# Patient Record
Sex: MALE | Race: WHITE | NOT HISPANIC OR LATINO | Employment: OTHER | URBAN - METROPOLITAN AREA
[De-identification: names, ages, dates, MRNs, and addresses within clinical notes are randomized per-mention and may not be internally consistent; named-entity substitution may affect disease eponyms.]

---

## 2022-10-26 ENCOUNTER — MEDICAL CORRESPONDENCE (OUTPATIENT)
Dept: HEALTH INFORMATION MANAGEMENT | Facility: CLINIC | Age: 72
End: 2022-10-26

## 2022-10-26 ENCOUNTER — TRANSFERRED RECORDS (OUTPATIENT)
Dept: HEALTH INFORMATION MANAGEMENT | Facility: CLINIC | Age: 72
End: 2022-10-26

## 2022-11-28 ENCOUNTER — TRANSCRIBE ORDERS (OUTPATIENT)
Dept: OTHER | Age: 72
End: 2022-11-28

## 2022-11-28 DIAGNOSIS — D35.2 BENIGN NEOPLASM OF PITUITARY GLAND (H): Primary | ICD-10-CM

## 2022-11-29 DIAGNOSIS — D35.2 PITUITARY ADENOMA (H): Primary | ICD-10-CM

## 2022-11-29 ASSESSMENT — ENCOUNTER SYMPTOMS: SINUS CONGESTION: 1

## 2022-11-29 NOTE — TELEPHONE ENCOUNTER
RECORDS RECEIVED FROM: external   REASON FOR VISIT: Benign neoplasm of pituitary gland   Date of Appt: 11/30/22   NOTES (FOR ALL VISITS) STATUS DETAILS   OFFICE NOTE from referring provider Received Dr Antonio Resendez @ [facility] Neurosurgery:  11/17/22   MEDICATION LIST Received    IMAGING  (FOR ALL VISITS)     MRI (HEAD, NECK, SPINE) Received [facility]:  MRI Brain 11/8/22   CT (HEAD, NECK, SPINE) Received [facility]:  CT Maxillofacial 11/22/22 *report with images in PACS*

## 2022-11-30 ENCOUNTER — LAB (OUTPATIENT)
Dept: LAB | Facility: CLINIC | Age: 72
End: 2022-11-30
Payer: COMMERCIAL

## 2022-11-30 ENCOUNTER — PRE VISIT (OUTPATIENT)
Dept: NEUROSURGERY | Facility: CLINIC | Age: 72
End: 2022-11-30

## 2022-11-30 ENCOUNTER — PREP FOR PROCEDURE (OUTPATIENT)
Dept: NEUROLOGY | Facility: CLINIC | Age: 72
End: 2022-11-30

## 2022-11-30 ENCOUNTER — OFFICE VISIT (OUTPATIENT)
Dept: NEUROSURGERY | Facility: CLINIC | Age: 72
End: 2022-11-30
Payer: COMMERCIAL

## 2022-11-30 VITALS
RESPIRATION RATE: 16 BRPM | DIASTOLIC BLOOD PRESSURE: 83 MMHG | OXYGEN SATURATION: 97 % | SYSTOLIC BLOOD PRESSURE: 142 MMHG | HEART RATE: 52 BPM

## 2022-11-30 DIAGNOSIS — D35.2 PITUITARY ADENOMA (H): ICD-10-CM

## 2022-11-30 DIAGNOSIS — D35.2 PITUITARY ADENOMA WITH EXTRASELLAR EXTENSION (H): Primary | ICD-10-CM

## 2022-11-30 LAB
ANION GAP SERPL CALCULATED.3IONS-SCNC: 9 MMOL/L (ref 7–15)
BUN SERPL-MCNC: 20.7 MG/DL (ref 8–23)
CALCIUM SERPL-MCNC: 10.1 MG/DL (ref 8.8–10.2)
CHLORIDE SERPL-SCNC: 102 MMOL/L (ref 98–107)
CORTIS SERPL-MCNC: 11.8 UG/DL
CREAT SERPL-MCNC: 1.17 MG/DL (ref 0.67–1.17)
DEPRECATED HCO3 PLAS-SCNC: 30 MMOL/L (ref 22–29)
FSH SERPL IRP2-ACNC: 4.6 MIU/ML (ref 1.5–12.4)
GFR SERPL CREATININE-BSD FRML MDRD: 67 ML/MIN/1.73M2
GLUCOSE SERPL-MCNC: 119 MG/DL (ref 70–99)
LH SERPL-ACNC: 4.6 MIU/ML (ref 1.7–8.6)
POTASSIUM SERPL-SCNC: 4.2 MMOL/L (ref 3.4–5.3)
PROLACTIN SERPL 3RD IS-MCNC: 13 NG/ML (ref 4–15)
SODIUM SERPL-SCNC: 141 MMOL/L (ref 136–145)
T4 FREE SERPL-MCNC: 0.93 NG/DL (ref 0.9–1.7)
TSH SERPL DL<=0.005 MIU/L-ACNC: 1.76 UIU/ML (ref 0.3–4.2)

## 2022-11-30 PROCEDURE — 84443 ASSAY THYROID STIM HORMONE: CPT | Performed by: PATHOLOGY

## 2022-11-30 PROCEDURE — 83001 ASSAY OF GONADOTROPIN (FSH): CPT | Mod: 90 | Performed by: PATHOLOGY

## 2022-11-30 PROCEDURE — 99000 SPECIMEN HANDLING OFFICE-LAB: CPT | Performed by: PATHOLOGY

## 2022-11-30 PROCEDURE — 84305 ASSAY OF SOMATOMEDIN: CPT | Mod: 90 | Performed by: PATHOLOGY

## 2022-11-30 PROCEDURE — 83003 ASSAY GROWTH HORMONE (HGH): CPT | Mod: 90 | Performed by: PATHOLOGY

## 2022-11-30 PROCEDURE — 80048 BASIC METABOLIC PNL TOTAL CA: CPT | Performed by: PATHOLOGY

## 2022-11-30 PROCEDURE — 82024 ASSAY OF ACTH: CPT | Mod: 90 | Performed by: PATHOLOGY

## 2022-11-30 PROCEDURE — 83002 ASSAY OF GONADOTROPIN (LH): CPT | Mod: 90 | Performed by: PATHOLOGY

## 2022-11-30 PROCEDURE — 84439 ASSAY OF FREE THYROXINE: CPT | Performed by: PATHOLOGY

## 2022-11-30 PROCEDURE — 82533 TOTAL CORTISOL: CPT | Mod: 90 | Performed by: PATHOLOGY

## 2022-11-30 PROCEDURE — 84146 ASSAY OF PROLACTIN: CPT | Mod: 90 | Performed by: PATHOLOGY

## 2022-11-30 PROCEDURE — 99205 OFFICE O/P NEW HI 60 MIN: CPT | Performed by: NEUROLOGICAL SURGERY

## 2022-11-30 PROCEDURE — 84403 ASSAY OF TOTAL TESTOSTERONE: CPT | Mod: 90 | Performed by: PATHOLOGY

## 2022-11-30 PROCEDURE — 36415 COLL VENOUS BLD VENIPUNCTURE: CPT | Performed by: PATHOLOGY

## 2022-11-30 RX ORDER — CEFTRIAXONE 2 G/1
2 INJECTION, POWDER, FOR SOLUTION INTRAMUSCULAR; INTRAVENOUS EVERY 12 HOURS
Status: CANCELLED | OUTPATIENT
Start: 2022-11-30

## 2022-11-30 ASSESSMENT — PAIN SCALES - GENERAL: PAINLEVEL: MILD PAIN (3)

## 2022-11-30 NOTE — PATIENT INSTRUCTIONS
"You were seen today with Dr. Richard Lim. Your primary contact after today's visit is: Debra RN    Next steps:  We will begin planning for surgery in approximately May of 2023.   Labs today - 1st floor of this building, take a left out of the elevator area.      How to Contact Us  Send a Mailpilehart message to your provider.   Call the clinic - your call will be routed appropriately.   Neurosurgery Clinic: [phone]  ENT Clinic: [phone]   To speak directly to an RN Care Coordinator:  Debra RN: [phone]  Araseli RN: [phone]    Note: We do our best to check voicemail frequently throughout the day and make every effort to return calls within 1-2 business days. For urgent matters, please use the general clinic phone numbers listed above.       Endoscopic Endonasal Surgery Instructions    PREPARING FOR SURGERY  You will need a preoperative assessment within 30 days of your surgery. We will set this up for you once we know your surgery date.  Before surgery, call the clinic:   If there is any change in your health, or you are having more frequent or severe symptoms   If you develop a cold or flu, or if you test positive for COVID-19   If you have any questions about what to expect before, during, or after surgery   If you need assistance filling out paperwork for short-term disability or FMLA, or you need a letter excusing you from work       COVID-19 TESTING BEFORE SURGERY  The COVID-19 pandemic is a very challenging time for everyone. Our goal is to keep you and our team safe and healthy. You will need a COVID-19 test within 4 days of surgery. This MUST be a PCR test (a \"rapid\" test does not count). To schedule a PCR test, call [phone] ([phone]) or visit: [email or web address]. We urge you to get tested at one of our [facility] testing sites. We process tests in our lab and can get the results quickly.    If you live outside of the [facility] and need to complete " this test at another healthcare system:  Let us know the name of the clinic/lab and their fax number. We will need to send them an order before you schedule.  Plan ahead, and schedule your COVID test as soon as you know your surgery date.  Ask your clinic to fax the results to [facility] at fax # [phone].    After your test and before your procedure, please follow these safety guidelines. They help to protect you and lower the risk of your surgery being postponed.  Limit trips outside your home.  Limit the number of people you see.  Always wear a face covering outside your home.  Use social distancing and stay 6 feet away from others whenever you can.  Wash your hands often.    Please let us know if you have tested positive for COVID within 90 days of your surgery - you may not need to have a COVID test. If your test before surgery shows you have COVID-19, we will probably need to postpone your surgery.      MEDICATIONS BEFORE SURGERY  You will receive specific instructions about how to take your medications at your preoperative assessment visit. Talk to your care team about every medication that you take, including over-the-counter medications and supplements. Some medications can make you bleed too much during surgery, and some change how well anesthesia drugs work. Follow these general instructions for common medications, unless otherwise directed.     INSTRUCTIONS MEDICATION   Hold for 7 days before surgery  Supplements   Multivitamins   Fish Oil     Hold for 7 days before surgery Aspirin (note: some medications can contain aspirin, like Daija-Oakley)     Hold for 7 days before surgery Ibuprofen (Advil, Motrin)     Hold for 4 days before surgery  Naproxen (Aleve)      Talk with the Preoperative Assessment Center (PAC) about how to take these medications before surgery    Insulin or oral medications for diabetes     Blood pressure medications     Anticoagulant medications, including:    warfarin  (Coumadin)   enoxaparin (Lovenox)   dabigatran (Pradaxa)   apixaban (Eliquis)   rivaroxaban (Xarelto)     Antiplatelet medications, including:   clopidogrel bisulfate (Plavix)   cilostazol (Pletal)      Okay to keep taking for pain, as needed    Acetaminophen (Tylenol)        EATING AND DRINKING BEFORE SURGERY  Eat and drink as usual until 8 hours before your surgery arrival time. After that, no food or milk.   Drink clear liquids until 1 hour before your surgery arrival time.  Clear liquids are liquids you can see through, like water, Gatorade, and Propel. You may also have black coffee and tea (no cream or milk).   Nothing by mouth within 1 hour of your surgery arrival time. This includes gum, candy, and breath mints.   If your care team tells you take medicine on the morning of surgery, it is okay to take medicine with a sip of water.   Do not drink alcohol for at least 24 hours before surgery. Do not use marijuana for 7 days prior to surgery.      PREVENTING INFECTION  Shower or bathe the night before and morning of your surgery following the instructions on your handout,  Showering Before Surgery.    Note: Only use the special antiseptic soap from your neck to your toes. Your care team will clean the skin at your surgery site.   Don t shave or clip hair near your surgery site. We ll remove the hair if needed.   Having diabetes and/or smoking can cause delayed wound healing and increase your risk of a surgical site infection. Quitting smoking and lowering your blood sugars can make a big difference. If you would like support with this, please let us know or work with your primary care provider.        SMOKING AND NICOTINE  Don t smoke or vape the morning of surgery. You may chew nicotine gum up to 2 hours before surgery. A nicotine patch is okay.   We strongly recommend quitting smoking and other tobacco products. Nicotine can cause delayed healing and wound complications after surgery.       PLANNING AHEAD -  "FINANCES  [email or web address]  [facility] Billing: Please call [phone], if you wish to pay a bill.  [facility] Financial Counselors: Please call [phone], if you have questions about possible costs and coverage or to discuss options if you don't have enough - or no - insurance for your care.  [facility] Cost of Care Estimates: Please visit the website to view our online estimate tool. If you have additional questions, call [phone] for estimates.  Our financial team will contact your insurance company as soon as surgery is scheduled. If your insurance company requires a prior authorization (pre-approval), our financial team will complete these necessary steps. Typically, we don t encounter many issues with insurance denying coverage for skull base surgery.    It is a good idea to call your insurance company and let them know you re having surgery. Ask questions about your deductible, co-insurance, and what of out-of-pocket costs you will be responsible for.   [employer]: You might hear about a company called [employer], with whom we contract to provide monitoring of your nerves during surgery (called \"intraoperative neuromonitoring\"). You may receive a letter from [employer] after your surgery, and this company and letter are legitimate. For questions and more information, please visit: [email or web address]      COVID-19 AND VISITOR RESTRICTIONS  Please visit [email or web address] for the latest information about hospital visitor restrictions.      HEALTHCARE DIRECTIVE  Consider preparing a Health Care Directive and choosing a Health Care Agent. A Health Care Directive is a written plan outlining your values and priorities for your future medical treatment. A Health Care Agent makes health care decisions based on your wishes " "if you are unable to communicate.   Download a document, information materials or register for a free class on advance care planning and creating a health care directive by visiting [email or web address], calling [phone], or emailing [email or web address].   If you have a health care directive or choose to complete a healthcare directive before surgery, please upload the document to [email or web address]. This will be attached to your chart. You may also want to bring a copy with you on the day of surgery.       YOUR SURGERY DAY  Parking:   Both self-park and  parking (24 hours, 7 days a week) are available at the [facility]. Self-park and  rates are the same. If the Patient Visitors Ramp is full or if a patient or visitor has limited mobility, please follow the signs to the  located at the main entrance. For more information, please visit: [email or web address]    Due to safety concerns around COVID-19, [facility] cannot offer  services to all patients at this time. However, we do still offer  services for patients with mobility limitations.   Imaging:  Your surgeon may want to do a CT or MRI scan on the day of surgery. This is most common with endoscopic endonasal surgery (through your nose). Sometimes, you will need to have small stickers or markers, called fiducials, placed on your head for the scan. This gives your surgeon a \"map\" of the surgical area, and helps your surgeon navigate around important structures during the surgery. If you will need fiducials, you will have small areas of hair shaved so the markers will stick to your head. These markers are temporary, and will come off after your surgery.  What to bring:  Photo ID and insurance card   Copy of your healthcare directive (if you have one)   Glasses with case (you can t wear contacts during surgery)   Hearing aids with case   A few personal items (pack lightly)   Cell phone and "    Inhaler (if you use)   Eye drops (if you use)   If you have a pacemaker, ICD (defibrillator) or other implant, please bring the ID card   If you have an implanted stimulator, please bring the remote control   If you have a legal guardian, bring a copy of the certified (court-stamped) guardianship papers   What to leave at home:  Please remove any jewelry, including body piercings   Leave jewelry and other valuables at home       HOSPITAL STAY  On average, your hospital stay will be between 1-3 days. It could be shorter or longer depending on your specific procedure, your health, and your recovery. The first 24 hours of your hospital stay will typically be in the Intensive Care Unit (ICU). You will be monitored closely and may have some of the following: Intravenous (IV) fluids and medications, a catheter which drains your urine, or a lumbar drain (a small catheter in your low back).  After the ICU, you will be moved to a regular hospital bed/floor. The rest of your post-operative recovery will focus on your individual needs which may include: helping with balance, nausea, swallowing, bowel management, pain, and incision care.       HOME RECOVERY  Everyone's recovery is different based on their health condition, age, and overall health status.   Plan to have an adult stay with you for at least 24 hours after you get home from the hospital.   Plan to stay closer to home for the first few weeks after surgery. Avoid trips to busy locations, or those that require a lot of walking or activity.  Arrange for help at home. It is common to feel tired for the first few weeks (maybe months) and you will need to avoid some activities. Many people find that having someone help with household tasks, such as cleaning, cooking, and running errands is helpful.    Make your home safe by removing tripping hazards and moving items you need to a place where you can easily reach them.   It is okay to watch TV and use digital  media in moderation. Some people find that too much screen time can cause fatigue, headaches, or eye strain. Avoid screen time that can be overstimulating, such as video games, if possible.       ACTIVITY RESTRICTIONS  Avoid strenuous exercise and activity for 6-12 weeks.   Do not lift anything greater than 10 pounds (about a gallon of milk).  Extra pressure in your head can disrupt the healing of the internal surgical area and cause complications. Avoid the following activities that may increase the pressure in your head:  Avoid bending over with your head below your heart  Avoid heavy lifting or straining  Avoid lifting with your arms above your shoulders  Take care to prevent constipation, as this can lead to straining  Avoid drinking through straws  Avoid blowing your nose  Try to cough and sneeze with your mouth open      SLEEPING  Plan to sleep with your head elevated (at least 30 degrees) for at least 2 weeks after surgery. You may find that sleeping in a recliner is helpful and more comfortable. This is especially important if you have sleep apnea and are unable to use your CPAP after surgery.  Please let your surgeon know if you have sleep apnea. You will not be able to use your CPAP for at least 2 weeks after surgery, sometimes longer. We will monitor your breathing closely while you are in the hospital.      INCISION CARE - OTHER  Depending on your surgery, you might also have an incision on your upper leg / thigh or your abdomen. Sometimes, these incisions are covered with small strips of tape, called steri-strips. If you have steri-strips, allow these to peel off on their own.  If you had a lumbar drain during surgery, you will have a small, dissolvable suture in your low back.       PAIN MANAGEMENT  It is normal to have some pain after surgery. Most people do not experience severe pain. If you are experiencing severe pain at the incision site or severe headaches, please let us know right away.  You  will usually be sent home with a small amount of narcotic pain medication. You should gradually reduce the amount of pain medication that you take as your pain improves.  Avoid ibuprofen (Advil) or naproxen (Aleve) immediately after surgery, unless your surgeon tells you this is okay to take.   It is okay to take acetaminophen (Tylenol) for pain. You can take Tylenol with the narcotic pain medication, and some people find benefit in alternating between the narcotic pain medication and Tylenol. (Example: 1 tablet of oxycodone at 8:00 am, 1 tablet of Tylenol at 10:00 am).      DIET  A well-balanced diet is important for your recovery.   Try to eat plenty of fiber (fruits, whole grains, beans, and vegetables can be good sources of fiber) to help prevent constipation.   It is important to stay hydrated after surgery to prevent dehydration and help with bowel movements. Drink plenty of water throughout the day.      WORK AND FMLA / SHORT TERM DISABILITY  Most people take 6-12 weeks off work. This will depend on the type of work that you do and the surgery you are having. If you have a job that requires heavy lifting or strenuous activity, it's possible you may need to take 12 weeks off work.  We are happy to complete short term disability or FMLA paperwork for you. Please send a [employer] message (you can attach a document) with the paperwork that you need completed. Please let us know where you'd like for us to send the paperwork. We can send it directly to your leave  or employer, with your permission, or we can send it back to you.  Please allow at least 5-7 business days for paperwork completion and processing.       OTHER FREQUENTLY ASKED QUESTIONS  COVID-19 and influenza vaccines:   Avoid getting your flu or COVID vaccine within 1 week prior to your surgery date, and no sooner than 4 weeks after your surgery.   Dental work:  Avoid dental cleanings and dental work for at least 6 weeks after your  surgery.  Driving:  You should not drive if:  You are taking narcotic pain medication  You have had vision changes (you will need to work with your ophthalmologist for clearance to begin driving again)  You have had a seizure and have been told not to drive  Hair care (if you will have an incision on your head):  Avoid dying/coloring your hair within 1 week prior to your surgery.  Do not dye/color your hair until the incision is completely healed (no scabbing or open areas). This can sometimes take 8 or more weeks after surgery.   Avoid getting your hair cut immediately after surgery if you have a head incision. We want to avoid loose hair falling into the incision area as it is healing.  Travel:  Avoid flying for 3 months after surgery.   Avoid long car rides immediately after surgery. You can be more at risk for blood clots after surgery, and long car rides put you at greater risk. If you absolutely must travel in the car, make sure to stop frequently to walk, and move your feet/legs (ankle pumps) as much as you can while riding in the car.  Talk with your surgeon if you have questions about other travel plans.      CALL THE CLINIC IF YOU EXPERIENCE:  Drainage, swelling, fluid collection, or increased redness around the incision   Fever, or temperature of 101 degrees or higher   Stiff neck or extra sensitivity to light   Clear nasal drainage which you did not have before surgery, or a new salty/metallic taste  Increase in facial weakness   Vision changes  Pain not managed by your pain medication   Severe constipation or abdominal pain  Running low on prescription medication that was prescribed to you at the time of surgery   Any other symptoms that you have questions about    After clinic hours or on the weekends, please call the hospital  at [phone] and ask to speak with the Neurosurgery or ENT resident on call. If you have a serious emergency, call 911 or come to the emergency room. If you can, come  to the hospital where you had your surgery.     During clinic hours:   Department  Phone    Neurosurgery    [phone]    Ear, Nose, & Throat (ENT)    [phone]     Skull Base RN Care Coordinators:  We do our best to check voicemail frequently throughout the day. For urgent matters, please use the general clinic phone numbers listed above. Your call will be routed appropriately.     Debra Nobles MA, RN, PHN, [facility]   RN Care Coordinator & Skull Base    Direct: [phone]     Marcy Moore, FRANK   Neurosurgery - Dr. Lim   Direct: [phone]      Ruth Ann Melvin, RN   ENT - Dr. Prakash Weinberg   Direct: [phone]

## 2022-11-30 NOTE — PROGRESS NOTES
[facility]      Name: Bill Daugherty  : 1950  Referring provider: Dr. Resendez    2022      Reason for visit: Pituitary adenoma, new patient visit    Dear Dr. Resendez,     It was a pleasure to see Mr. Daugherty in the [facility] today as a new patient.  As you recall, Mr. Daugherty is a 71 year old right-handed male who has a history of sinusitis symptoms.  [facility] [place] [facility].  He underwent imaging that demonstrated an incidentally discovered pituitary adenoma for which she is referred.  Of note, he has undergone 3 prior functional endoscopic surgeries for sinus disease.  He has not noticed rivka vision loss however he does have a history of 2 cataracts.  He does not wear glasses or contacts.  He has no double vision.  He has no thin skin, easy bruising, changes in weight, changes in hand or foot size.  Of note, he is having a knee replacement in February.     Review of Systems:   Pertinent items are noted in HPI or as in patient entered ROS below, remainder of complete ROS is negative.     Active Medications: aspirin, multivitamin, norvasc, losartan, jardiance, alogliptin benzoate, neurontin     Allergies: metformin     Past Medical History: mixed hyperlipidemia, gout, DALLAS, secondary localized osteoarthrosis, essential hypertension, type II diabetes, lung nodule      Past Surgical History: nasal polyp excision, shoulder surgery, ganglion cyst removal, left knee arthroplasty    Family History: CAD     Social History:  From [place] but now is in [place]. 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 In the [employer] industry. Quit smoking 40 years ago.     Physical Exam:   General: No acute distress.   Head: No signs of trauma.    Eyes: Conjunctivae are normal.  Mouth/Throat: Oropharynx moist.  Neck: Normal range of motion.    Resp: No respiratory distress.   MSK: Moves all extremities.  No  obvious deformity.  Neuro: The patient is fully oriented and quite pleasant. Speech normal. Visual acuity 20/25 bilaterally.  Visual fields full to confrontation. Extraocular movements are intact without nystagmus. Facial sensation is intact in V1, V2, V3 distributions. Facial nerve function is normal. Palate is symmetric. Shoulders are full strength. Tongue is midline. There is no pronator drift. Full strength in all extremities. Sensation intact throughout. No dysmetria with finger-nose-finger testing.  Psych: Normal mood and affect. Behavior is normal.      Imaging:  We reviewed his MRI and CT scan from November 2022 which were uploaded into our system.  These demonstrate sinusitis changes in the sinonasal cavity.  There is also a 1.5 cm probable pituitary adenoma biased towards the left side in the sella.  There is slight suprasellar extension but no mass-effect on the optic apparatus.  The pituitary stalk and pituitary gland are deviated towards the right-hand side.  There is not appear to be rivka cavernous sinus invasion.    Laboratory:  TSH 1.76   Free T4 0.93  LH 4.6  Prolactin 13  FSH 4.6  Cortisol 11.8 (12:30 PM)  Sodium 141     Assessment:  1.  Pituitary adenoma, nonfunctioning  2.  History of 3 functional endoscopic sinus surgeries for sinusitis    Plan:  1. We reviewed the MRI results of an incidentally discovered pituitary adenoma and we described options for management including observation, radiation, and surgical resection.  Given the size of the tumor, I believe it is likely that the tumor will grow and he may need treatment at some point however I stressed that it is not urgent.  Especially given his upcoming knee surgery, I recommended observation at this time at least until the point that he recovers from his knee surgery.  He has a strong preference to have surgery for removal of the tumor based on its size soon as he recovers from the surgery.    2. We discussed the perioperative expectations  and details of an endoscopic endonasal approach for resection of the pituitary adenoma, with possible abdominal fat graft, fascia isael graft, and lumbar drain placement.  We discussed the risks of the procedure including bleeding, infection, anosmia, CSF leak, vision loss, hormone imbalance/need for replacement, incomplete removal of the tumor, carotid artery injury/major stroke, diplopia, need for additional procedures.    3. We will place a case request and he would like to target a surgical date in May.    4. We will refer to our ENT colleagues for co-surgery.    5. PAC referral  6. CT/CTA the morning of surgery  7. I encouraged him to contact us with any questions or concerns.    It has been a pleasure to participate in the care of your patient. Please feel free to contact us if we may be of any assistance for Mr. Daugherty.      Richard Lim MD   Department of Neurosurgery  [facility]  [facility]         60 minutes spent on the date of the encounter doing chart review, review of outside records, review of test results, interpretation of tests, patient visit, documentation and discussion with other provider(s)      Scribe Preparation Attestation:  I, Janelle Blum, a scribe, prepared the chart for today's encounter.

## 2022-11-30 NOTE — LETTER
2022       RE: Bill Daugherty   [address] [place] [address]     Dear Colleague,    Thank you for referring your patient, Bill Daugherty, to the [facility] [place] [facility]. Please see a copy of my visit note below.      [facility]      Name: Bill Daugherty  : 1950  Referring provider: Dr. Resendez    2022      Reason for visit: Pituitary adenoma, new patient visit    Dear Dr. Resendez,     It was a pleasure to see Mr. Daugherty in the [facility] today as a new patient.  As you recall, Mr. Daugherty is a 71 year old right-handed male who has a history of sinusitis symptoms.  [facility] [place] [facility].  He underwent imaging that demonstrated an incidentally discovered pituitary adenoma for which she is referred.  Of note, he has undergone 3 prior functional endoscopic surgeries for sinus disease.  He has not noticed rivka vision loss however he does have a history of 2 cataracts.  He does not wear glasses or contacts.  He has no double vision.  He has no thin skin, easy bruising, changes in weight, changes in hand or foot size.  Of note, he is having a knee replacement in February.     Review of Systems:   Pertinent items are noted in HPI or as in patient entered ROS below, remainder of complete ROS is negative.     Active Medications: aspirin, multivitamin, norvasc, losartan, jardiance, alogliptin benzoate, neurontin     Allergies: metformin     Past Medical History: mixed hyperlipidemia, gout, DALLAS, secondary localized osteoarthrosis, essential hypertension, type II diabetes, lung nodule      Past Surgical History: nasal polyp excision, shoulder surgery, ganglion cyst removal, left knee arthroplasty    Family History: CAD     Social History:  From [place] but now is in [place]. 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 In the [employer] industry. Quit smoking 40 years ago.     Physical Exam:   General: No acute distress.   Head: No signs of trauma.    Eyes: Conjunctivae are normal.  Mouth/Throat: Oropharynx moist.  Neck: Normal range of motion.    Resp: No respiratory distress.   MSK: Moves all extremities.  No obvious deformity.  Neuro: The patient is fully oriented and quite pleasant. Speech normal. Visual acuity 20/25 bilaterally.  Visual fields full to confrontation. Extraocular movements are intact without nystagmus. Facial sensation is intact in V1, V2, V3 distributions. Facial nerve function is normal. Palate is symmetric. Shoulders are full strength. Tongue is midline. There is no pronator drift. Full strength in all extremities. Sensation intact throughout. No dysmetria with finger-nose-finger testing.  Psych: Normal mood and affect. Behavior is normal.      Imaging:  We reviewed his MRI and CT scan from November 2022 which were uploaded into our system.  These demonstrate sinusitis changes in the sinonasal cavity.  There is also a 1.5 cm probable pituitary adenoma biased towards the left side in the sella.  There is slight suprasellar extension but no mass-effect on the optic apparatus.  The pituitary stalk and pituitary gland are deviated towards the right-hand side.  There is not appear to be rivka cavernous sinus invasion.    Laboratory:  TSH 1.76   Free T4 0.93  LH 4.6  Prolactin 13  FSH 4.6  Cortisol 11.8 (12:30 PM)  Sodium 141     Assessment:  1.  Pituitary adenoma, nonfunctioning  2.  History of 3 functional endoscopic sinus surgeries for sinusitis    Plan:  1. We reviewed the MRI results of an incidentally discovered pituitary adenoma and we described options for management including observation, radiation, and surgical resection.  Given the size of the tumor, I believe it is likely that the tumor will grow and he may need treatment at some point however I stressed that it is not urgent.   Especially given his upcoming knee surgery, I recommended observation at this time at least until the point that he recovers from his knee surgery.  He has a strong preference to have surgery for removal of the tumor based on its size soon as he recovers from the surgery.    2. We discussed the perioperative expectations and details of an endoscopic endonasal approach for resection of the pituitary adenoma, with possible abdominal fat graft, fascia isael graft, and lumbar drain placement.  We discussed the risks of the procedure including bleeding, infection, anosmia, CSF leak, vision loss, hormone imbalance/need for replacement, incomplete removal of the tumor, carotid artery injury/major stroke, diplopia, need for additional procedures.    3. We will place a case request and he would like to target a surgical date in May.    4. We will refer to our ENT colleagues for co-surgery.    5. PAC referral  6. CT/CTA the morning of surgery  7. I encouraged him to contact us with any questions or concerns.    It has been a pleasure to participate in the care of your patient. Please feel free to contact us if we may be of any assistance for Mr. Daugherty.      60 minutes spent on the date of the encounter doing chart review, review of outside records, review of test results, interpretation of tests, patient visit, documentation and discussion with other provider(s)      Scribe Preparation Attestation:  I, Janelle Blum, a scribe, prepared the chart for today's encounter.              Again, thank you for allowing me to participate in the care of your patient.      Sincerely,    Richard Lim MD

## 2022-12-01 LAB
ACTH PLAS-MCNC: 20 PG/ML
GH SERPL-MCNC: 0.1 UG/L

## 2022-12-02 LAB
IGF-I BLD-MCNC: 124 NG/ML (ref 27–246)
TESTOST SERPL-MCNC: 135 NG/DL (ref 240–950)

## 2022-12-19 ENCOUNTER — TELEPHONE (OUTPATIENT)
Dept: OTOLARYNGOLOGY | Facility: CLINIC | Age: 72
End: 2022-12-19

## 2022-12-19 NOTE — TELEPHONE ENCOUNTER
Called patient to schedule surgery with Dr. Lim/Alan    Date of Surgery: 5/25    Location of surgery: [facility] OR    Pre-Op H&P: PAC scheduled    Pre/Post Imaging:  Yes    Discussed COVID-19 Testing: Not Applicable    Post-Op Appt Date: 2 weeks    Surgery Packet Mailed: 12/19      Additional comments: patient requested end of May 2023        Yolis Castro on 12/19/2022 at 10:50 AM

## 2023-01-30 ENCOUNTER — HEALTH MAINTENANCE LETTER (OUTPATIENT)
Age: 73
End: 2023-01-30

## 2023-02-01 ENCOUNTER — TRANSFERRED RECORDS (OUTPATIENT)
Dept: MULTI SPECIALTY CLINIC | Facility: CLINIC | Age: 73
End: 2023-02-01

## 2023-02-01 LAB
CREATININE (EXTERNAL): 1.1 MG/DL (ref 0.7–1.2)
GFR ESTIMATED (EXTERNAL): 72 ML/MIN/1.73M2
GLUCOSE (EXTERNAL): 194 MG/DL (ref 74–106)
HBA1C MFR BLD: 7 % (ref 4–6)
POTASSIUM (EXTERNAL): 3.9 MMOL/L (ref 3.5–5)
TSH SERPL-ACNC: 2.7 UIU/ML (ref 0.35–4.94)

## 2023-02-03 NOTE — TELEPHONE ENCOUNTER
FUTURE VISIT INFORMATION      SURGERY INFORMATION:    Date: 23    Location: [facility]    Surgeon:  Richard Lim MD Tyler, Matthew A, MD    Anesthesia Type:  general    Procedure: stealth assisted Endoscopic endonasal transsellar approach for tumor possible INSERTION, DRAIN, SPINE, LUMBAR possible fascia isael graft, possible abdominal fat graft    Consult: ov 22    RECORDS REQUESTED FROM:       Primary Care Provider: [facility]    Most recent EKG+ Tracin13- [facility]

## 2023-02-14 RX ORDER — TAMSULOSIN HYDROCHLORIDE 0.4 MG/1
0.4 CAPSULE ORAL EVERY EVENING
COMMUNITY

## 2023-02-14 RX ORDER — AMLODIPINE BESYLATE 10 MG/1
10 TABLET ORAL EVERY MORNING
COMMUNITY

## 2023-02-14 RX ORDER — ALOGLIPTIN 12.5 MG/1
12.5 TABLET, FILM COATED ORAL EVERY MORNING
COMMUNITY

## 2023-02-14 RX ORDER — SILDENAFIL 100 MG/1
100 TABLET, FILM COATED ORAL PRN
COMMUNITY

## 2023-02-14 RX ORDER — FLUTICASONE PROPIONATE 50 MCG
2 SPRAY, SUSPENSION (ML) NASAL PRN
COMMUNITY

## 2023-02-14 RX ORDER — ASPIRIN 81 MG/1
81 TABLET ORAL DAILY
Status: ON HOLD | COMMUNITY
End: 2023-02-17

## 2023-02-14 RX ORDER — DESONIDE 0.5 MG/G
CREAM TOPICAL 2 TIMES DAILY PRN
COMMUNITY

## 2023-02-14 RX ORDER — EMOLLIENT BASE
CREAM (GRAM) TOPICAL PRN
COMMUNITY

## 2023-02-14 RX ORDER — OXYMETAZOLINE HYDROCHLORIDE 0.05 G/100ML
2 SPRAY NASAL 3 TIMES DAILY PRN
COMMUNITY

## 2023-02-14 RX ORDER — LOSARTAN POTASSIUM 25 MG/1
25 TABLET ORAL EVERY MORNING
COMMUNITY

## 2023-02-14 RX ORDER — ALLOPURINOL 100 MG/1
200 TABLET ORAL DAILY
COMMUNITY

## 2023-02-14 NOTE — PROGRESS NOTES
Pre-op Total Joint Patient Screening    1. Do you have a ride available to come to the hospital the day after your surgery by 8am with anticipated discharge of 11am? Y  2. What is the name of this person? Stacy (daughter)- she will be available only for the pickup by 11am  3. Do you have a  set up after surgery? Y  4. Will your  be the same person that gives you a ride home after surgery? : Sabrina (spouse)  5. Have you received the Joint Replacement Guidebook? Y  6. Do you have any questions about your guidebook? N  7. Have you signed up for Epic Care Companion? Y  8. Have you signed up for MY Chart access? Y

## 2023-02-15 NOTE — PROGRESS NOTES
PTA medications updated by Medication Scribe prior to surgery via phone call with patient (last doses completed by Nurse)     Medication history sources: Patient, Surescripts, H&P and Patient's home med list  In the past week, patient estimated taking medication this percent of the time: Greater than 90%  Adherence assessment: N/A Not Observed    Significant changes made to the medication list:  None      Additional medication history information:   None    Medication reconciliation completed by provider prior to medication history? No    Time spent in this activity: 30 minutes    The information provided in this note is only as accurate as the sources available at the time of update(s)    Prior to Admission medications    Medication Sig Last Dose Taking? Auth Provider Long Term End Date   allopurinol (ZYLOPRIM) 100 MG tablet Take 200 mg by mouth daily (2 x 100 mg = 200 mg)  at am Yes Reported, Patient     alogliptin (NESINA) 12.5 MG tablet Take 12.5 mg by mouth daily  at am Yes Reported, Patient Yes    amLODIPine (NORVASC) 10 MG tablet Take 10 mg by mouth daily  at am Yes Reported, Patient Yes    aspirin 81 MG EC tablet Take 81 mg by mouth daily 2/11/2023 at am Yes Reported, Patient     desonide (DESOWEN) 0.05 % external cream Apply topically 2 times daily as needed Unknown at prn Yes Reported, Patient     emollient (VANICREAM) external cream Apply topically as needed (for dry skin) Unknown at prn Yes Reported, Patient     empagliflozin (JARDIANCE) 25 MG TABS tablet Take 12.5 mg by mouth daily (0.5 x 25 mg = 12.5 mg) 2/15/2023 at am Yes Reported, Patient     fluticasone (FLONASE) 50 MCG/ACT nasal spray Spray 2 sprays into both nostrils as needed Unknown at prn Yes Reported, Patient     losartan (COZAAR) 25 MG tablet Take 25 mg by mouth daily 2/15/2023 at am Yes Reported, Patient Yes    Multiple Vitamin (MULTIVITAMIN PO) Take 1 tablet by mouth daily 2/15/2023 at am Yes Reported, Patient     oxymetazoline (AFRIN)  0.05 % nasal spray Spray 2 sprays into both nostrils 3 times daily as needed for congestion Unknown at prn Yes Reported, Patient     sildenafil (VIAGRA) 100 MG tablet Take 100 mg by mouth as needed (1 hour before sexual activity) Unknown at prn Yes Reported, Patient Yes    sodium chloride (OCEAN) 0.65 % nasal spray Spray 2 sprays into both nostrils daily as needed for congestion Unknown at prn Yes Reported, Patient     tamsulosin (FLOMAX) 0.4 MG capsule Take 0.4 mg by mouth every evening 2/15/2023 at pm Yes Reported, Patient       Medication history completed by:    Jose Page CPhT  Medication [facility]

## 2023-02-16 ENCOUNTER — APPOINTMENT (OUTPATIENT)
Dept: PHYSICAL THERAPY | Facility: CLINIC | Age: 73
End: 2023-02-16
Attending: ORTHOPAEDIC SURGERY
Payer: COMMERCIAL

## 2023-02-16 ENCOUNTER — APPOINTMENT (OUTPATIENT)
Dept: GENERAL RADIOLOGY | Facility: CLINIC | Age: 73
End: 2023-02-16
Attending: SPECIALIST/TECHNOLOGIST
Payer: COMMERCIAL

## 2023-02-16 ENCOUNTER — HOSPITAL ENCOUNTER (OUTPATIENT)
Facility: CLINIC | Age: 73
Discharge: HOME | End: 2023-02-17
Attending: ORTHOPAEDIC SURGERY | Admitting: ORTHOPAEDIC SURGERY
Payer: COMMERCIAL

## 2023-02-16 ENCOUNTER — ANESTHESIA (OUTPATIENT)
Dept: SURGERY | Facility: CLINIC | Age: 73
End: 2023-02-16
Payer: COMMERCIAL

## 2023-02-16 ENCOUNTER — ANESTHESIA EVENT (OUTPATIENT)
Dept: SURGERY | Facility: CLINIC | Age: 73
End: 2023-02-16
Payer: COMMERCIAL

## 2023-02-16 DIAGNOSIS — Z96.651 STATUS POST TOTAL RIGHT KNEE REPLACEMENT: Primary | ICD-10-CM

## 2023-02-16 PROBLEM — M17.11 OSTEOARTHRITIS OF RIGHT KNEE, UNSPECIFIED OSTEOARTHRITIS TYPE: Status: ACTIVE | Noted: 2023-02-16

## 2023-02-16 LAB
FASTING STATUS PATIENT QL REPORTED: YES
GLUCOSE BLDC GLUCOMTR-MCNC: 174 MG/DL (ref 70–99)
GLUCOSE BLDC GLUCOMTR-MCNC: 199 MG/DL (ref 70–99)
GLUCOSE SERPL-MCNC: 140 MG/DL (ref 70–99)
PLATELET # BLD AUTO: 200 10E3/UL (ref 150–450)
POTASSIUM SERPL-SCNC: 4.1 MMOL/L (ref 3.4–5.3)
RADIOLOGIST FLAGS: ABNORMAL

## 2023-02-16 PROCEDURE — 250N000011 HC RX IP 250 OP 636: Performed by: NURSE ANESTHETIST, CERTIFIED REGISTERED

## 2023-02-16 PROCEDURE — 99221 1ST HOSP IP/OBS SF/LOW 40: CPT | Performed by: PHYSICIAN ASSISTANT

## 2023-02-16 PROCEDURE — 250N000011 HC RX IP 250 OP 636: Performed by: SPECIALIST/TECHNOLOGIST

## 2023-02-16 PROCEDURE — 360N000077 HC SURGERY LEVEL 4, PER MIN: Performed by: ORTHOPAEDIC SURGERY

## 2023-02-16 PROCEDURE — 999N000063 XR KNEE PORT RIGHT 1/2 VIEWS: Mod: RT

## 2023-02-16 PROCEDURE — 258N000003 HC RX IP 258 OP 636: Performed by: ANESTHESIOLOGY

## 2023-02-16 PROCEDURE — 36415 COLL VENOUS BLD VENIPUNCTURE: CPT | Performed by: ANESTHESIOLOGY

## 2023-02-16 PROCEDURE — C1776 JOINT DEVICE (IMPLANTABLE): HCPCS | Performed by: ORTHOPAEDIC SURGERY

## 2023-02-16 PROCEDURE — 97116 GAIT TRAINING THERAPY: CPT | Mod: GP

## 2023-02-16 PROCEDURE — 85049 AUTOMATED PLATELET COUNT: CPT | Performed by: ANESTHESIOLOGY

## 2023-02-16 PROCEDURE — 84132 ASSAY OF SERUM POTASSIUM: CPT | Performed by: ANESTHESIOLOGY

## 2023-02-16 PROCEDURE — 370N000017 HC ANESTHESIA TECHNICAL FEE, PER MIN: Performed by: ORTHOPAEDIC SURGERY

## 2023-02-16 PROCEDURE — 250N000011 HC RX IP 250 OP 636: Performed by: ORTHOPAEDIC SURGERY

## 2023-02-16 PROCEDURE — 250N000013 HC RX MED GY IP 250 OP 250 PS 637: Performed by: PHYSICIAN ASSISTANT

## 2023-02-16 PROCEDURE — 710N000009 HC RECOVERY PHASE 1, LEVEL 1, PER MIN: Performed by: ORTHOPAEDIC SURGERY

## 2023-02-16 PROCEDURE — 250N000009 HC RX 250: Performed by: ANESTHESIOLOGY

## 2023-02-16 PROCEDURE — 258N000003 HC RX IP 258 OP 636: Performed by: SPECIALIST/TECHNOLOGIST

## 2023-02-16 PROCEDURE — 250N000013 HC RX MED GY IP 250 OP 250 PS 637: Performed by: SPECIALIST/TECHNOLOGIST

## 2023-02-16 PROCEDURE — 272N000001 HC OR GENERAL SUPPLY STERILE: Performed by: ORTHOPAEDIC SURGERY

## 2023-02-16 PROCEDURE — 258N000003 HC RX IP 258 OP 636: Performed by: ORTHOPAEDIC SURGERY

## 2023-02-16 PROCEDURE — 250N000025 HC SEVOFLURANE, PER MIN: Performed by: ORTHOPAEDIC SURGERY

## 2023-02-16 PROCEDURE — 97530 THERAPEUTIC ACTIVITIES: CPT | Mod: GP

## 2023-02-16 PROCEDURE — 82947 ASSAY GLUCOSE BLOOD QUANT: CPT | Performed by: ANESTHESIOLOGY

## 2023-02-16 PROCEDURE — C1713 ANCHOR/SCREW BN/BN,TIS/BN: HCPCS | Performed by: ORTHOPAEDIC SURGERY

## 2023-02-16 PROCEDURE — 97161 PT EVAL LOW COMPLEX 20 MIN: CPT | Mod: GP

## 2023-02-16 PROCEDURE — 250N000011 HC RX IP 250 OP 636: Performed by: ANESTHESIOLOGY

## 2023-02-16 PROCEDURE — 999N000141 HC STATISTIC PRE-PROCEDURE NURSING ASSESSMENT: Performed by: ORTHOPAEDIC SURGERY

## 2023-02-16 PROCEDURE — 120N000001 HC R&B MED SURG/OB

## 2023-02-16 PROCEDURE — 250N000009 HC RX 250: Performed by: NURSE ANESTHETIST, CERTIFIED REGISTERED

## 2023-02-16 DEVICE — CRUCIATE RETAINING FEMORAL
Type: IMPLANTABLE DEVICE | Site: KNEE | Status: FUNCTIONAL
Brand: TRIATHLON

## 2023-02-16 DEVICE — PATELLA
Type: IMPLANTABLE DEVICE | Site: KNEE | Status: FUNCTIONAL
Brand: TRIATHLON

## 2023-02-16 DEVICE — TOBRA FULL DOSE ANTIBIOTIC BONE CEMENT, 10 PACK CATALOG NUMBER IS 6197-9-010
Type: IMPLANTABLE DEVICE | Site: KNEE | Status: FUNCTIONAL
Brand: SIMPLEX

## 2023-02-16 DEVICE — UNIVERSAL TIBIAL BASEPLATE
Type: IMPLANTABLE DEVICE | Site: KNEE | Status: FUNCTIONAL
Brand: TRIATHLON

## 2023-02-16 DEVICE — TIBIAL BEARING INSERT - CS
Type: IMPLANTABLE DEVICE | Site: KNEE | Status: FUNCTIONAL
Brand: TRIATHLON

## 2023-02-16 RX ORDER — OXYCODONE HYDROCHLORIDE 5 MG/1
10 TABLET ORAL EVERY 4 HOURS PRN
Status: DISCONTINUED | OUTPATIENT
Start: 2023-02-16 | End: 2023-02-16 | Stop reason: HOSPADM

## 2023-02-16 RX ORDER — ACETAMINOPHEN 325 MG/1
975 TABLET ORAL EVERY 8 HOURS
Status: DISCONTINUED | OUTPATIENT
Start: 2023-02-16 | End: 2023-02-17 | Stop reason: HOSPADM

## 2023-02-16 RX ORDER — FENTANYL CITRATE 0.05 MG/ML
25 INJECTION, SOLUTION INTRAMUSCULAR; INTRAVENOUS EVERY 5 MIN PRN
Status: DISCONTINUED | OUTPATIENT
Start: 2023-02-16 | End: 2023-02-16 | Stop reason: HOSPADM

## 2023-02-16 RX ORDER — EPHEDRINE SULFATE 50 MG/ML
INJECTION, SOLUTION INTRAMUSCULAR; INTRAVENOUS; SUBCUTANEOUS PRN
Status: DISCONTINUED | OUTPATIENT
Start: 2023-02-16 | End: 2023-02-16

## 2023-02-16 RX ORDER — TAMSULOSIN HYDROCHLORIDE 0.4 MG/1
0.4 CAPSULE ORAL EVERY EVENING
Status: DISCONTINUED | OUTPATIENT
Start: 2023-02-16 | End: 2023-02-17 | Stop reason: HOSPADM

## 2023-02-16 RX ORDER — VANCOMYCIN HYDROCHLORIDE 1 G/20ML
INJECTION, POWDER, LYOPHILIZED, FOR SOLUTION INTRAVENOUS PRN
Status: DISCONTINUED | OUTPATIENT
Start: 2023-02-16 | End: 2023-02-16 | Stop reason: HOSPADM

## 2023-02-16 RX ORDER — FENTANYL CITRATE 0.05 MG/ML
50 INJECTION, SOLUTION INTRAMUSCULAR; INTRAVENOUS EVERY 5 MIN PRN
Status: DISCONTINUED | OUTPATIENT
Start: 2023-02-16 | End: 2023-02-16 | Stop reason: HOSPADM

## 2023-02-16 RX ORDER — ASPIRIN 81 MG/1
81 TABLET ORAL 2 TIMES DAILY
Status: DISCONTINUED | OUTPATIENT
Start: 2023-02-16 | End: 2023-02-17 | Stop reason: HOSPADM

## 2023-02-16 RX ORDER — ONDANSETRON 4 MG/1
4 TABLET, ORALLY DISINTEGRATING ORAL EVERY 30 MIN PRN
Status: DISCONTINUED | OUTPATIENT
Start: 2023-02-16 | End: 2023-02-16 | Stop reason: HOSPADM

## 2023-02-16 RX ORDER — ONDANSETRON 2 MG/ML
4 INJECTION INTRAMUSCULAR; INTRAVENOUS EVERY 6 HOURS PRN
Status: DISCONTINUED | OUTPATIENT
Start: 2023-02-16 | End: 2023-02-17 | Stop reason: HOSPADM

## 2023-02-16 RX ORDER — LOSARTAN POTASSIUM 25 MG/1
25 TABLET ORAL DAILY
Status: DISCONTINUED | OUTPATIENT
Start: 2023-02-17 | End: 2023-02-17 | Stop reason: HOSPADM

## 2023-02-16 RX ORDER — HYDROMORPHONE HCL IN WATER/PF 6 MG/30 ML
0.2 PATIENT CONTROLLED ANALGESIA SYRINGE INTRAVENOUS EVERY 5 MIN PRN
Status: DISCONTINUED | OUTPATIENT
Start: 2023-02-16 | End: 2023-02-16 | Stop reason: HOSPADM

## 2023-02-16 RX ORDER — BISACODYL 10 MG
10 SUPPOSITORY, RECTAL RECTAL DAILY PRN
Status: DISCONTINUED | OUTPATIENT
Start: 2023-02-16 | End: 2023-02-17 | Stop reason: HOSPADM

## 2023-02-16 RX ORDER — HYDROMORPHONE HCL IN WATER/PF 6 MG/30 ML
0.2 PATIENT CONTROLLED ANALGESIA SYRINGE INTRAVENOUS
Status: DISCONTINUED | OUTPATIENT
Start: 2023-02-16 | End: 2023-02-17 | Stop reason: HOSPADM

## 2023-02-16 RX ORDER — OXYCODONE HYDROCHLORIDE 5 MG/1
10 TABLET ORAL EVERY 4 HOURS PRN
Status: DISCONTINUED | OUTPATIENT
Start: 2023-02-16 | End: 2023-02-17 | Stop reason: HOSPADM

## 2023-02-16 RX ORDER — ACETAMINOPHEN 325 MG/1
975 TABLET ORAL ONCE
Status: COMPLETED | OUTPATIENT
Start: 2023-02-16 | End: 2023-02-16

## 2023-02-16 RX ORDER — NICOTINE POLACRILEX 4 MG
15-30 LOZENGE BUCCAL
Status: DISCONTINUED | OUTPATIENT
Start: 2023-02-16 | End: 2023-02-17 | Stop reason: HOSPADM

## 2023-02-16 RX ORDER — AMOXICILLIN 250 MG
1 CAPSULE ORAL 2 TIMES DAILY
Status: DISCONTINUED | OUTPATIENT
Start: 2023-02-16 | End: 2023-02-17 | Stop reason: HOSPADM

## 2023-02-16 RX ORDER — ONDANSETRON 4 MG/1
4 TABLET, ORALLY DISINTEGRATING ORAL EVERY 6 HOURS PRN
Status: DISCONTINUED | OUTPATIENT
Start: 2023-02-16 | End: 2023-02-17 | Stop reason: HOSPADM

## 2023-02-16 RX ORDER — NALOXONE HYDROCHLORIDE 0.4 MG/ML
0.2 INJECTION, SOLUTION INTRAMUSCULAR; INTRAVENOUS; SUBCUTANEOUS
Status: DISCONTINUED | OUTPATIENT
Start: 2023-02-16 | End: 2023-02-17 | Stop reason: HOSPADM

## 2023-02-16 RX ORDER — NALOXONE HYDROCHLORIDE 0.4 MG/ML
0.4 INJECTION, SOLUTION INTRAMUSCULAR; INTRAVENOUS; SUBCUTANEOUS
Status: DISCONTINUED | OUTPATIENT
Start: 2023-02-16 | End: 2023-02-17 | Stop reason: HOSPADM

## 2023-02-16 RX ORDER — CEFAZOLIN SODIUM IN 0.9 % NACL 3 G/100 ML
3 INTRAVENOUS SOLUTION, PIGGYBACK (ML) INTRAVENOUS EVERY 8 HOURS
Status: COMPLETED | OUTPATIENT
Start: 2023-02-16 | End: 2023-02-17

## 2023-02-16 RX ORDER — LIDOCAINE HYDROCHLORIDE 20 MG/ML
INJECTION, SOLUTION INFILTRATION; PERINEURAL PRN
Status: DISCONTINUED | OUTPATIENT
Start: 2023-02-16 | End: 2023-02-16

## 2023-02-16 RX ORDER — CEFTRIAXONE 2 G/1
2 INJECTION, POWDER, FOR SOLUTION INTRAMUSCULAR; INTRAVENOUS EVERY 12 HOURS
Status: DISCONTINUED | OUTPATIENT
Start: 2023-02-16 | End: 2023-02-16

## 2023-02-16 RX ORDER — ONDANSETRON 2 MG/ML
4 INJECTION INTRAMUSCULAR; INTRAVENOUS EVERY 30 MIN PRN
Status: DISCONTINUED | OUTPATIENT
Start: 2023-02-16 | End: 2023-02-16 | Stop reason: HOSPADM

## 2023-02-16 RX ORDER — TRANEXAMIC ACID 650 MG/1
1950 TABLET ORAL ONCE
Status: COMPLETED | OUTPATIENT
Start: 2023-02-16 | End: 2023-02-16

## 2023-02-16 RX ORDER — DEXAMETHASONE SODIUM PHOSPHATE 4 MG/ML
INJECTION, SOLUTION INTRA-ARTICULAR; INTRALESIONAL; INTRAMUSCULAR; INTRAVENOUS; SOFT TISSUE PRN
Status: DISCONTINUED | OUTPATIENT
Start: 2023-02-16 | End: 2023-02-16

## 2023-02-16 RX ORDER — AMLODIPINE BESYLATE 10 MG/1
10 TABLET ORAL DAILY
Status: DISCONTINUED | OUTPATIENT
Start: 2023-02-17 | End: 2023-02-17

## 2023-02-16 RX ORDER — OXYCODONE HYDROCHLORIDE 5 MG/1
5 TABLET ORAL EVERY 4 HOURS PRN
Status: DISCONTINUED | OUTPATIENT
Start: 2023-02-16 | End: 2023-02-16 | Stop reason: HOSPADM

## 2023-02-16 RX ORDER — PROCHLORPERAZINE MALEATE 5 MG
5 TABLET ORAL EVERY 6 HOURS PRN
Status: DISCONTINUED | OUTPATIENT
Start: 2023-02-16 | End: 2023-02-17 | Stop reason: HOSPADM

## 2023-02-16 RX ORDER — ONDANSETRON 2 MG/ML
INJECTION INTRAMUSCULAR; INTRAVENOUS PRN
Status: DISCONTINUED | OUTPATIENT
Start: 2023-02-16 | End: 2023-02-16

## 2023-02-16 RX ORDER — PROPOFOL 10 MG/ML
INJECTION, EMULSION INTRAVENOUS CONTINUOUS PRN
Status: DISCONTINUED | OUTPATIENT
Start: 2023-02-16 | End: 2023-02-16

## 2023-02-16 RX ORDER — FAMOTIDINE 20 MG/1
20 TABLET, FILM COATED ORAL 2 TIMES DAILY
Status: DISCONTINUED | OUTPATIENT
Start: 2023-02-16 | End: 2023-02-17 | Stop reason: HOSPADM

## 2023-02-16 RX ORDER — CEFAZOLIN SODIUM/WATER 3 G/30 ML
3 SYRINGE (ML) INTRAVENOUS EVERY 8 HOURS
Status: DISCONTINUED | OUTPATIENT
Start: 2023-02-16 | End: 2023-02-16

## 2023-02-16 RX ORDER — POLYETHYLENE GLYCOL 3350 17 G/17G
17 POWDER, FOR SOLUTION ORAL DAILY
Status: DISCONTINUED | OUTPATIENT
Start: 2023-02-17 | End: 2023-02-17 | Stop reason: HOSPADM

## 2023-02-16 RX ORDER — KETOROLAC TROMETHAMINE 15 MG/ML
15 INJECTION, SOLUTION INTRAMUSCULAR; INTRAVENOUS EVERY 6 HOURS
Status: DISCONTINUED | OUTPATIENT
Start: 2023-02-16 | End: 2023-02-17 | Stop reason: HOSPADM

## 2023-02-16 RX ORDER — OXYCODONE HYDROCHLORIDE 5 MG/1
5 TABLET ORAL EVERY 4 HOURS PRN
Status: DISCONTINUED | OUTPATIENT
Start: 2023-02-16 | End: 2023-02-17 | Stop reason: HOSPADM

## 2023-02-16 RX ORDER — HYDROMORPHONE HCL IN WATER/PF 6 MG/30 ML
0.4 PATIENT CONTROLLED ANALGESIA SYRINGE INTRAVENOUS
Status: DISCONTINUED | OUTPATIENT
Start: 2023-02-16 | End: 2023-02-17 | Stop reason: HOSPADM

## 2023-02-16 RX ORDER — CEFAZOLIN SODIUM/WATER 2 G/20 ML
2 SYRINGE (ML) INTRAVENOUS
Status: COMPLETED | OUTPATIENT
Start: 2023-02-16 | End: 2023-02-16

## 2023-02-16 RX ORDER — GLYCOPYRROLATE 0.2 MG/ML
INJECTION, SOLUTION INTRAMUSCULAR; INTRAVENOUS PRN
Status: DISCONTINUED | OUTPATIENT
Start: 2023-02-16 | End: 2023-02-16

## 2023-02-16 RX ORDER — ALLOPURINOL 100 MG/1
200 TABLET ORAL DAILY
Status: DISCONTINUED | OUTPATIENT
Start: 2023-02-17 | End: 2023-02-17 | Stop reason: HOSPADM

## 2023-02-16 RX ORDER — FENTANYL CITRATE 50 UG/ML
INJECTION, SOLUTION INTRAMUSCULAR; INTRAVENOUS PRN
Status: DISCONTINUED | OUTPATIENT
Start: 2023-02-16 | End: 2023-02-16

## 2023-02-16 RX ORDER — SODIUM CHLORIDE, SODIUM LACTATE, POTASSIUM CHLORIDE, CALCIUM CHLORIDE 600; 310; 30; 20 MG/100ML; MG/100ML; MG/100ML; MG/100ML
INJECTION, SOLUTION INTRAVENOUS CONTINUOUS
Status: DISCONTINUED | OUTPATIENT
Start: 2023-02-16 | End: 2023-02-16 | Stop reason: HOSPADM

## 2023-02-16 RX ORDER — DEXTROSE MONOHYDRATE 25 G/50ML
25-50 INJECTION, SOLUTION INTRAVENOUS
Status: DISCONTINUED | OUTPATIENT
Start: 2023-02-16 | End: 2023-02-17 | Stop reason: HOSPADM

## 2023-02-16 RX ORDER — PROPOFOL 10 MG/ML
INJECTION, EMULSION INTRAVENOUS PRN
Status: DISCONTINUED | OUTPATIENT
Start: 2023-02-16 | End: 2023-02-16

## 2023-02-16 RX ORDER — SODIUM CHLORIDE, SODIUM LACTATE, POTASSIUM CHLORIDE, CALCIUM CHLORIDE 600; 310; 30; 20 MG/100ML; MG/100ML; MG/100ML; MG/100ML
INJECTION, SOLUTION INTRAVENOUS CONTINUOUS
Status: DISCONTINUED | OUTPATIENT
Start: 2023-02-16 | End: 2023-02-17 | Stop reason: HOSPADM

## 2023-02-16 RX ORDER — CEFAZOLIN SODIUM/WATER 2 G/20 ML
2 SYRINGE (ML) INTRAVENOUS SEE ADMIN INSTRUCTIONS
Status: DISCONTINUED | OUTPATIENT
Start: 2023-02-16 | End: 2023-02-16 | Stop reason: HOSPADM

## 2023-02-16 RX ORDER — NEOSTIGMINE METHYLSULFATE 1 MG/ML
VIAL (ML) INJECTION PRN
Status: DISCONTINUED | OUTPATIENT
Start: 2023-02-16 | End: 2023-02-16

## 2023-02-16 RX ORDER — LIDOCAINE 40 MG/G
CREAM TOPICAL
Status: DISCONTINUED | OUTPATIENT
Start: 2023-02-16 | End: 2023-02-17 | Stop reason: HOSPADM

## 2023-02-16 RX ORDER — HYDROMORPHONE HCL IN WATER/PF 6 MG/30 ML
0.4 PATIENT CONTROLLED ANALGESIA SYRINGE INTRAVENOUS EVERY 5 MIN PRN
Status: DISCONTINUED | OUTPATIENT
Start: 2023-02-16 | End: 2023-02-16 | Stop reason: HOSPADM

## 2023-02-16 RX ORDER — ACETAMINOPHEN 325 MG/1
650 TABLET ORAL EVERY 4 HOURS PRN
Status: DISCONTINUED | OUTPATIENT
Start: 2023-02-19 | End: 2023-02-17 | Stop reason: HOSPADM

## 2023-02-16 RX ADMIN — PROPOFOL 30 MCG/KG/MIN: 10 INJECTION, EMULSION INTRAVENOUS at 12:27

## 2023-02-16 RX ADMIN — ROCURONIUM BROMIDE 50 MG: 50 INJECTION, SOLUTION INTRAVENOUS at 12:28

## 2023-02-16 RX ADMIN — ROCURONIUM BROMIDE 10 MG: 50 INJECTION, SOLUTION INTRAVENOUS at 13:58

## 2023-02-16 RX ADMIN — MIDAZOLAM 2 MG: 1 INJECTION INTRAMUSCULAR; INTRAVENOUS at 12:21

## 2023-02-16 RX ADMIN — LIDOCAINE HYDROCHLORIDE 100 MG: 20 INJECTION, SOLUTION INFILTRATION; PERINEURAL at 12:27

## 2023-02-16 RX ADMIN — CEFAZOLIN 3 G: 10 INJECTION, POWDER, FOR SOLUTION INTRAVENOUS at 20:50

## 2023-02-16 RX ADMIN — Medication 5 MG: at 14:13

## 2023-02-16 RX ADMIN — SODIUM CHLORIDE, POTASSIUM CHLORIDE, SODIUM LACTATE AND CALCIUM CHLORIDE: 600; 310; 30; 20 INJECTION, SOLUTION INTRAVENOUS at 13:12

## 2023-02-16 RX ADMIN — Medication 10 MG: at 13:50

## 2023-02-16 RX ADMIN — PROPOFOL 200 MG: 10 INJECTION, EMULSION INTRAVENOUS at 12:27

## 2023-02-16 RX ADMIN — ROCURONIUM BROMIDE 10 MG: 50 INJECTION, SOLUTION INTRAVENOUS at 13:37

## 2023-02-16 RX ADMIN — MIDAZOLAM 2 MG: 1 INJECTION INTRAMUSCULAR; INTRAVENOUS at 10:25

## 2023-02-16 RX ADMIN — KETOROLAC TROMETHAMINE 15 MG: 15 INJECTION, SOLUTION INTRAMUSCULAR; INTRAVENOUS at 23:47

## 2023-02-16 RX ADMIN — GLYCOPYRROLATE 1 MG: 0.2 INJECTION, SOLUTION INTRAMUSCULAR; INTRAVENOUS at 14:18

## 2023-02-16 RX ADMIN — SODIUM CHLORIDE, POTASSIUM CHLORIDE, SODIUM LACTATE AND CALCIUM CHLORIDE: 600; 310; 30; 20 INJECTION, SOLUTION INTRAVENOUS at 19:39

## 2023-02-16 RX ADMIN — KETOROLAC TROMETHAMINE 15 MG: 15 INJECTION, SOLUTION INTRAMUSCULAR; INTRAVENOUS at 17:54

## 2023-02-16 RX ADMIN — SENNOSIDES AND DOCUSATE SODIUM 1 TABLET: 50; 8.6 TABLET ORAL at 20:50

## 2023-02-16 RX ADMIN — FENTANYL CITRATE 50 MCG: 50 INJECTION, SOLUTION INTRAMUSCULAR; INTRAVENOUS at 15:30

## 2023-02-16 RX ADMIN — GLYCOPYRROLATE 0.2 MG: 0.2 INJECTION, SOLUTION INTRAMUSCULAR; INTRAVENOUS at 12:41

## 2023-02-16 RX ADMIN — FAMOTIDINE 20 MG: 20 TABLET, FILM COATED ORAL at 20:50

## 2023-02-16 RX ADMIN — ACETAMINOPHEN 975 MG: 325 TABLET, FILM COATED ORAL at 17:54

## 2023-02-16 RX ADMIN — FENTANYL CITRATE 100 MCG: 50 INJECTION, SOLUTION INTRAMUSCULAR; INTRAVENOUS at 12:26

## 2023-02-16 RX ADMIN — NEOSTIGMINE METHYLSULFATE 5 MG: 1 INJECTION, SOLUTION INTRAVENOUS at 14:18

## 2023-02-16 RX ADMIN — ATROPINE SULFATE 0.25 MG: 1 INJECTION, SOLUTION INTRAMUSCULAR; INTRAVENOUS; SUBCUTANEOUS at 13:05

## 2023-02-16 RX ADMIN — FENTANYL CITRATE 50 MCG: 50 INJECTION, SOLUTION INTRAMUSCULAR; INTRAVENOUS at 15:15

## 2023-02-16 RX ADMIN — SODIUM CHLORIDE, POTASSIUM CHLORIDE, SODIUM LACTATE AND CALCIUM CHLORIDE: 600; 310; 30; 20 INJECTION, SOLUTION INTRAVENOUS at 09:50

## 2023-02-16 RX ADMIN — ATROPINE SULFATE 0.25 MG: 1 INJECTION, SOLUTION INTRAMUSCULAR; INTRAVENOUS; SUBCUTANEOUS at 13:03

## 2023-02-16 RX ADMIN — TAMSULOSIN HYDROCHLORIDE 0.4 MG: 0.4 CAPSULE ORAL at 20:50

## 2023-02-16 RX ADMIN — TRANEXAMIC ACID 1950 MG: 650 TABLET ORAL at 09:49

## 2023-02-16 RX ADMIN — ONDANSETRON 4 MG: 2 INJECTION INTRAMUSCULAR; INTRAVENOUS at 14:08

## 2023-02-16 RX ADMIN — ACETAMINOPHEN 975 MG: 325 TABLET, FILM COATED ORAL at 09:49

## 2023-02-16 RX ADMIN — ASPIRIN 81 MG: 81 TABLET, COATED ORAL at 20:50

## 2023-02-16 RX ADMIN — GLYCOPYRROLATE 0.2 MG: 0.2 INJECTION, SOLUTION INTRAMUSCULAR; INTRAVENOUS at 12:45

## 2023-02-16 RX ADMIN — Medication 3 G: at 12:22

## 2023-02-16 RX ADMIN — ROCURONIUM BROMIDE 10 MG: 50 INJECTION, SOLUTION INTRAVENOUS at 12:51

## 2023-02-16 RX ADMIN — BUPIVACAINE HYDROCHLORIDE 15 ML: 5 INJECTION, SOLUTION EPIDURAL; INTRACAUDAL at 11:20

## 2023-02-16 RX ADMIN — HYDROMORPHONE HYDROCHLORIDE 0.5 MG: 1 INJECTION, SOLUTION INTRAMUSCULAR; INTRAVENOUS; SUBCUTANEOUS at 13:00

## 2023-02-16 RX ADMIN — DEXAMETHASONE SODIUM PHOSPHATE 10 MG: 4 INJECTION, SOLUTION INTRA-ARTICULAR; INTRALESIONAL; INTRAMUSCULAR; INTRAVENOUS; SOFT TISSUE at 12:32

## 2023-02-16 ASSESSMENT — ACTIVITIES OF DAILY LIVING (ADL)
ADLS_ACUITY_SCORE: 18
ADLS_ACUITY_SCORE: 33
ADLS_ACUITY_SCORE: 20
ADLS_ACUITY_SCORE: 18
ADLS_ACUITY_SCORE: 20

## 2023-02-16 ASSESSMENT — COPD QUESTIONNAIRES: COPD: 0

## 2023-02-16 ASSESSMENT — ENCOUNTER SYMPTOMS: DYSRHYTHMIAS: 0

## 2023-02-16 ASSESSMENT — LIFESTYLE VARIABLES: TOBACCO_USE: 0

## 2023-02-16 NOTE — ADDENDUM NOTE
Addendum  created 02/16/23 1543 by Prashant Angelo MD    Child order released for a procedure order, Clinical Note Signed, Intraprocedure Blocks edited, SmartForm saved

## 2023-02-16 NOTE — CONSULTS
[facility]  Consult Note - Hospitalist Service  Date of Admission:  2/16/2023  Consult Requested by: Tanya Aguero PA-C  Reason for Consult: medical management DM, HTN    Assessment & Plan   Bill Daugherty is a 72 year old male with a past medical history significant for obesity, DALLSA on CPAP, T2DM, HTN, BPH, pituitary macroadenoma admitted on 2/16/2023 following R TKA. Hospitalist service was asked to see the patient post operatively for medical management DM, HTN.     OA s/p R TKA 2/16/2023  Procedure was performed by Dr. Mcgill under general anesthesia with an EBL of 25ml. There were no intraoperative complications.  --post op cares per Orthopedic service  --PT/OT  --aggressive IS  --hgb in the am     T2DM  A1C is 7.0. Received 10mg dexamethasone intra op  PTA: alogliptin 12.5mg daily, jardiance 12.5mg daily   --hold PTA oral medications  --medium intensity sliding scale insulin as needed   --mod carb diet     DALLAS on CPAP  Obesity  May have some obesity hypoventilation syndrome as well.   --continue CPAP with home settings @ hs and with naps  --continuous pulse oximetry and capno monitoring per protocol     HTN  PTA: losartan 25mg daily, amlodipine 10mg daily  --continue PTA amlodipine and losartan with hold parameters    BPH  He has not voided yet after surgery. Reports issues with retention after previous surgeries.   --Continue PTA flomax.   --Monitor UOP, PVRs per protocol     Hx gout  Continue PTA allopurinol     Pituitary adenoma, non functioning   Plan for surgical resection in May. Can follow up outpatient as planned     The patient's care was discussed with Dr. Barnett who agrees with the above plan     Hospitalist team will follow up with chart check in the am. Please do not hesitate to reach out with questions or concerns.     Clinically Significant Risk Factors Present on Admission                  # Hypertension: home medication list includes antihypertensive(s)   # Non-Invasive  mechanical ventilation: current O2 Device: BiPAP/CPAP  # Acute hypoxic respiratory failure: continue supplemental O2 as needed     # Severe Obesity: Estimated body mass index is 41.37 kg/m  as calculated from the following:    Height as of this encounter: 1.829 m (6').    Weight as of this encounter: 138.3 kg (305 lb).           Roselia Hunt PA-C  Hospitalist Service  Securely message with [employer] (more info)  Text page via [facility] Paging/Directory   ______________________________________________________________________    Chief Complaint   TKA    History is obtained from the patient    History of Present Illness   Bill Daugherty is a 72 year old male with a past medical history significant for obesity, DALLAS on CPAP, T2DM, HTN, BPH, pituitary macroadenoma admitted on 2/16/2023 following R TKA. Hospitalist service was asked to see the patient post operatively for medical management DM, HTN.   Procedure was performed under general anesthesia with nerve block with an EBL of 25ml. There were no intraoperative complications. He is presently evaluated in his room on the orthopedic floor. Reports overall he is doing well at this time. Denies CP, SOB, dizziness, nausea. Feels very dry. Reports he has been unable to void which has been a problem after previous surgeries for him.     Past Medical History    Past Medical History:   Diagnosis Date     Bilateral low back pain with left-sided sciatica      BPH (benign prostatic hyperplasia)      Diabetes mellitus, type 2 (H)      Gout      History of pancreatitis      HLP (hyperkeratosis lenticularis perstans)      HTN (hypertension)      Nephrolithiasis      Obesity      DALLAS (obstructive sleep apnea)     uses cpap     Pituitary macroadenoma (H)      Pulmonary nodules      PVD (posterior vitreous detachment), unspecified laterality      Shoulder pain      Steatosis of liver        Past Surgical History   Past Surgical History:   Procedure Laterality Date     CHOLECYSTECTOMY        ENDOSCOPIC RETROGRADE CHOLANGIOPANCREATOGRAPHY       septoplasty and turbinate reduction Bilateral      TOTAL KNEE ARTHROPLASTY Left        Medications   Medications Prior to Admission   Medication Sig Dispense Refill Last Dose     allopurinol (ZYLOPRIM) 100 MG tablet Take 200 mg by mouth daily (2 x 100 mg = 200 mg)   2/16/2023 at am     alogliptin (NESINA) 12.5 MG tablet Take 12.5 mg by mouth daily   2/16/2023 at am     amLODIPine (NORVASC) 10 MG tablet Take 10 mg by mouth daily   2/16/2023 at am     aspirin 81 MG EC tablet Take 81 mg by mouth daily   2/11/2023 at am     desonide (DESOWEN) 0.05 % external cream Apply topically 2 times daily as needed   Unknown at prn     emollient (VANICREAM) external cream Apply topically as needed (for dry skin)   Unknown at prn     empagliflozin (JARDIANCE) 25 MG TABS tablet Take 12.5 mg by mouth daily (0.5 x 25 mg = 12.5 mg)   2/15/2023 at am     fluticasone (FLONASE) 50 MCG/ACT nasal spray Spray 2 sprays into both nostrils as needed   Unknown at prn     losartan (COZAAR) 25 MG tablet Take 25 mg by mouth daily   2/15/2023 at am     Multiple Vitamin (MULTIVITAMIN PO) Take 1 tablet by mouth daily   2/15/2023 at am     oxymetazoline (AFRIN) 0.05 % nasal spray Spray 2 sprays into both nostrils 3 times daily as needed for congestion   Unknown at prn     sildenafil (VIAGRA) 100 MG tablet Take 100 mg by mouth as needed (1 hour before sexual activity)   Unknown at prn     sodium chloride (OCEAN) 0.65 % nasal spray Spray 2 sprays into both nostrils daily as needed for congestion   Unknown at prn     tamsulosin (FLOMAX) 0.4 MG capsule Take 0.4 mg by mouth every evening   2/15/2023 at pm         Physical Exam   Temp: 97.8  F (36.6  C) Temp src: Skin BP: (!) 161/79 Pulse: 78   Resp: 12 SpO2: 97 % O2 Device: BiPAP/CPAP Oxygen Delivery: 10 LPM  Vitals:    02/16/23 0947   Weight: 138.3 kg (305 lb)     Vital Signs with Ranges  Temp:  [97.8  F (36.6  C)-98.1  F (36.7  C)] 97.8  F (36.6   C)  Pulse:  [57-81] 78  Resp:  [10-29] 12  BP: (117-168)/(61-81) 161/79  SpO2:  [90 %-98 %] 97 %  I/O last 3 completed shifts:  In: 1700 [I.V.:1700]  Out: 25 [Blood:25]    Constitutional: Alert and oriented, sitting up in bed. Appears comfortable and is appropriately conversant.   ENT:  dry mucous membranes  Respiratory: Lungs clear to auscultation bilaterally, no increased work of breathing  Cardiovascular: Regular rate and rhythm, no significant LE edema  GI:  active bowel sounds, abdomen soft, non-tender  MSK:  Moves all four extremities. Wiggles toes on both feet  Neuro:  Speech is clear. Face symmetric. Follows commands       Medical Decision Making       35 MINUTES SPENT BY ME on the date of service doing chart review, history, exam, documentation & further activities per the note.      Data     I have personally reviewed the following data over the past 24 hrs:    N/A  \   N/A   / 200     N/A N/A N/A /  140 (H)   4.1 N/A N/A \       Imaging results reviewed over the past 24 hrs:   Recent Results (from the past 24 hour(s))   XR Knee Port Right 1/2 Views   Result Value    Radiologist flags Impression (Urgent)    Narrative    XR KNEE PORT RIGHT 1/2 VIEWS   2/16/2023 3:46 PM     HISTORY: Post-Op Total Knee  COMPARISON: None.       Impression    IMPRESSION:     1.  There are immediate postoperative changes from right total knee  arthroplasty, in standard alignment. No periprosthetic fracture is  identified.  2.  There is a 1.5 x 0.7 cm nonspecific, circumscribed lucent lesion  in the proximal tibial diaphysis, best seen on the lateral view.  Comparison to any prior knee radiographs is recommended. In the  absence of prior imaging, follow-up radiographs in 3-6 months would be  recommended.      [Access Center: Impression: #2]    This report will be copied to the [facility] to ensure a  provider acknowledges the finding. Access Center is available Monday  through Friday 8am-3:30 pm.     DOMINICK LAFLEUR  MD         SYSTEM ID:  [id number]

## 2023-02-16 NOTE — INTERVAL H&P NOTE
[facility]- Pediatric Dermatology  Dr. Mony Valentino, Dr. Roldan Kebede, Dr. Sobeida Valadez, GUS Whelan Dr[address] Dr. Kayla Lopes & Dr. Jens Novoa       Non Urgent  Nurse Triage Line; [phone]- Coral and Verito PRESLEY Care Coordinators      [facility] - [phone]      If you need a prescription refill, please contact your pharmacy. Refills are approved or denied by our Physicians during normal business hours, Monday through Fridays    Per office policy, refills will not be granted if you have not been seen within the past year (or sooner depending on your child's condition)      Scheduling Information:     Pediatric Appointment Scheduling and Call Center [phone]   Radiology Scheduling- [phone]     Sedation Unit Scheduling- [phone]    [facility] Scheduling- [facility] [phone]; Pediatric Dermatology [phone]    Main  Services: [phone]   Surinamese: [phone]   Thai: [phone]   Hmong/British/Montserratian: [phone]      Preadmission Nursing Department Fax Number: [phone] (Fax all pre-operative paperwork to this number)      For urgent matters arising during evenings, weekends, or holidays that cannot wait for normal business hours please call [phone] and ask for the Dermatology Resident On-Call to be paged.         Today you received your loading dose of Dupixent!!!! In 2 weeks from today you will receive your next injection of Dupixent but only ONE injection is required.     Please call the clinic with any questions or concerns, pink eye symptoms, cold sores or injection site reactions.     Please refer to your handouts for additional support and information .    Follow up with Dr. Valentino in 6-8 weeks.          I have reviewed the surgical (or preoperative) H&P that is linked to this encounter, and examined the patient. There are no significant changes    Clinical Conditions Present on Arrival:  Clinically Significant Risk Factors Present on Admission                    # Severe Obesity: Estimated body mass index is 41.37 kg/m  as calculated from the following:    Height as of this encounter: 1.829 m (6').    Weight as of this encounter: 138.3 kg (305 lb).

## 2023-02-16 NOTE — PROGRESS NOTES
Patient stated numbness and no feeling in left leg  from knee to top of left foot. States nerve was severed in previous left leg surgery.

## 2023-02-16 NOTE — ANESTHESIA CARE TRANSFER NOTE
Patient: Bill Daugherty    Procedure: Procedure(s):  RIGHT TOTAL KNEE ARTHROPLASTY       Diagnosis: Osteoarthritis of right knee [M17.11]  Diagnosis Additional Information: No value filed.    Anesthesia Type:   General     Note:    Oropharynx: oropharynx clear of all foreign objects  Level of Consciousness: awake  Oxygen Supplementation: face mask  Level of Supplemental Oxygen (L/min / FiO2): 10  Independent Airway: airway patency satisfactory and stable  Dentition: dentition unchanged  Vital Signs Stable: post-procedure vital signs reviewed and stable  Report to RN Given: handoff report given  Patient transferred to: PACU    Handoff Report: Identifed the Patient, Identified the Reponsible Provider, Reviewed the pertinent medical history, Discussed the surgical course, Reviewed Intra-OP anesthesia mangement and issues during anesthesia, Set expectations for post-procedure period and Allowed opportunity for questions and acknowledgement of understanding      Vitals:  Vitals Value Taken Time   /71 02/16/23 1446   Temp     Pulse 80 02/16/23 1448   Resp 37 02/16/23 1448   SpO2 94 % 02/16/23 1448   Vitals shown include unvalidated device data.    Electronically Signed By: ANDRÉS Driver CRNA  February 16, 2023  2:49 PM

## 2023-02-16 NOTE — ANESTHESIA PREPROCEDURE EVALUATION
Anesthesia Pre-Procedure Evaluation    Patient: Bill Daugherty   MRN: [id number] : 1950        Procedure : Procedure(s):  RIGHT TOTAL KNEE ARTHROPLASTY          Past Medical History:   Diagnosis Date     Bilateral low back pain with left-sided sciatica      BPH (benign prostatic hyperplasia)      Diabetes mellitus, type 2 (H)      Gout      History of pancreatitis      HLP (hyperkeratosis lenticularis perstans)      HTN (hypertension)      Nephrolithiasis      Obesity      DALLAS (obstructive sleep apnea)     uses cpap     Pituitary macroadenoma (H)      Pulmonary nodules      PVD (posterior vitreous detachment), unspecified laterality      Shoulder pain      Steatosis of liver       Past Surgical History:   Procedure Laterality Date     CHOLECYSTECTOMY       ENDOSCOPIC RETROGRADE CHOLANGIOPANCREATOGRAPHY       septoplasty and turbinate reduction Bilateral      TOTAL KNEE ARTHROPLASTY Left       Allergies   Allergen Reactions     Atorvastatin      Metformin      Other reaction(s): Gastrointestinal     Pravastatin      Other reaction(s): Pancreatitis     Chloraprep One Step Rash      Social History     Tobacco Use     Smoking status: Not on file     Smokeless tobacco: Not on file   Substance Use Topics     Alcohol use: Not on file      Wt Readings from Last 1 Encounters:   No data found for Wt        Anesthesia Evaluation   Pt has had prior anesthetic. Type: General.    No history of anesthetic complications       ROS/MED HX  ENT/Pulmonary:     (+) sleep apnea, uses CPAP,  (-) tobacco use, asthma and COPD   Neurologic:  - neg neurologic ROS     Cardiovascular:     (+) Dyslipidemia hypertension----- (-) CAD, pacemaker, arrhythmias, pacemaker and ICD   METS/Exercise Tolerance:     Hematologic:  - neg hematologic  ROS     Musculoskeletal:       GI/Hepatic: Comment: Fatty liver    (+) liver disease,  (-) GERD   Renal/Genitourinary:     (+) Nephrolithiasis , BPH,  (-) renal disease   Endo:     (+) type II DM, Last  HgA1c: 7, Not using insulin, Obesity,     Psychiatric/Substance Use:       Infectious Disease:       Malignancy:       Other:            Physical Exam    Airway        Mallampati: III   TM distance: > 3 FB   Neck ROM: full   Mouth opening: > 3 cm    Respiratory Devices and Support         Dental       (+) Minor Abnormalities - some fillings, tiny chips      Cardiovascular          Rhythm and rate: regular and normal     Pulmonary   pulmonary exam normal                OUTSIDE LABS:  CBC: No results found for: WBC, HGB, HCT, PLT  BMP:   Lab Results   Component Value Date     11/30/2022    POTASSIUM 4.2 11/30/2022    CHLORIDE 102 11/30/2022    CO2 30 (H) 11/30/2022    BUN 20.7 11/30/2022    CR 1.17 11/30/2022     (H) 11/30/2022     COAGS: No results found for: PTT, INR, FIBR  POC: No results found for: BGM, HCG, HCGS  HEPATIC: No results found for: ALBUMIN, PROTTOTAL, ALT, AST, GGT, ALKPHOS, BILITOTAL, BILIDIRECT, BRYCE  OTHER:   Lab Results   Component Value Date    NAVEEN 10.1 11/30/2022    TSH 1.76 11/30/2022    T4 0.93 11/30/2022       Anesthesia Plan    ASA Status:  3   NPO Status:  NPO Appropriate    Anesthesia Type: General.     - Airway: ETT   Induction: Intravenous.   Maintenance: Balanced.        Consents    Anesthesia Plan(s) and associated risks, benefits, and realistic alternatives discussed. Questions answered and patient/representative(s) expressed understanding.    - Discussed:     - Discussed with:  Patient      - Extended Intubation/Ventilatory Support Discussed: No.      - Patient is DNR/DNI Status: No    Use of blood products discussed: No .     Postoperative Care    Pain management: Multi-modal analgesia, Peripheral nerve block (Single Shot).   PONV prophylaxis: Ondansetron (or other 5HT-3), Background Propofol Infusion     Comments:                Prashant Angelo MD

## 2023-02-16 NOTE — ANESTHESIA POSTPROCEDURE EVALUATION
Patient: Bill Daugherty    Procedure: Procedure(s):  RIGHT TOTAL KNEE ARTHROPLASTY       Anesthesia Type:  General    Note:  Disposition: Inpatient   Postop Pain Control: Uneventful            Sign Out: Well controlled pain   PONV: No   Neuro/Psych: Uneventful            Sign Out: Acceptable/Baseline neuro status   Airway/Respiratory: Uneventful            Sign Out: Acceptable/Baseline resp. status   CV/Hemodynamics: Uneventful            Sign Out: Acceptable CV status; No obvious hypovolemia; No obvious fluid overload   Other NRE: NONE   DID A NON-ROUTINE EVENT OCCUR? No           Last vitals:  Vitals Value Taken Time   /81 02/16/23 1530   Temp 36.6  C (97.8  F) 02/16/23 1446   Pulse 68 02/16/23 1530   Resp 27 02/16/23 1528   SpO2 93 % 02/16/23 1535   Vitals shown include unvalidated device data.    Electronically Signed By: Prashant Angelo MD  February 16, 2023  3:36 PM

## 2023-02-16 NOTE — ANESTHESIA PROCEDURE NOTES
"Adductor canal Procedure Note    Pre-Procedure   Staff -        Anesthesiologist:  Prashant Angelo MD       Performed By: anesthesiologist       Location: pre-op       Pre-Anesthestic Checklist: patient identified, IV checked, site marked, risks and benefits discussed, informed consent, monitors and equipment checked, pre-op evaluation, at physician/surgeon's request and post-op pain management  Timeout:       Correct Patient: Yes        Correct Procedure: Yes        Correct Site: Yes        Correct Position: Yes        Correct Laterality: Yes        Site Marked: Yes  Procedure Documentation  Procedure: Adductor canal       Laterality: right       Patient Position: supine       Patient Prep/Sterile Barriers: sterile gloves, mask       Skin prep: Chloraprep       Local skin infiltrated with mL of 1% lidocaine.        Needle Type: short bevel       Needle Gauge: 21.        Needle Length (Inches): 4        Ultrasound guided       1. Ultrasound was used to identify targeted nerve, plexus, vascular marker, or fascial plane and place a needle adjacent to it in real-time.       2. Ultrasound was used to visualize the spread of anesthetic in close proximity to the above referenced structure.       3. A permanent image is entered into the patient's record.       4. The visualized anatomic structures appeared normal.       5. There were no apparent abnormal pathologic findings.    Assessment/Narrative         The placement was negative for: blood aspirated and painful injection       Paresthesias: No.       Bolus given via needle..        Secured via.        Insertion/Infusion Method: Single Shot       Complications: none       Injection made incrementally with aspirations every 5 mL.    Medication(s) Administered   Bupivacaine 0.5% w/ 1:400K Epi (Injection) - Injection   15 mL - 2/16/2023 11:20:00 AM    FOR [facility] ([facility] [place]) ONLY:   Pain Team Contact information: please page the Pain Team Via [employer]. Search \"Pain\". " During daytime hours, please page the attending first. At night please page the resident first.

## 2023-02-17 ENCOUNTER — APPOINTMENT (OUTPATIENT)
Dept: PHYSICAL THERAPY | Facility: CLINIC | Age: 73
End: 2023-02-17
Attending: ORTHOPAEDIC SURGERY
Payer: COMMERCIAL

## 2023-02-17 ENCOUNTER — APPOINTMENT (OUTPATIENT)
Dept: OCCUPATIONAL THERAPY | Facility: CLINIC | Age: 73
End: 2023-02-17
Attending: ORTHOPAEDIC SURGERY
Payer: COMMERCIAL

## 2023-02-17 VITALS
RESPIRATION RATE: 18 BRPM | TEMPERATURE: 97.6 F | OXYGEN SATURATION: 94 % | DIASTOLIC BLOOD PRESSURE: 65 MMHG | HEIGHT: 72 IN | BODY MASS INDEX: 41.31 KG/M2 | WEIGHT: 305 LBS | HEART RATE: 72 BPM | SYSTOLIC BLOOD PRESSURE: 124 MMHG

## 2023-02-17 LAB
GLUCOSE BLDC GLUCOMTR-MCNC: 193 MG/DL (ref 70–99)
GLUCOSE BLDC GLUCOMTR-MCNC: 202 MG/DL (ref 70–99)
GLUCOSE SERPL-MCNC: 185 MG/DL (ref 70–99)

## 2023-02-17 PROCEDURE — 97110 THERAPEUTIC EXERCISES: CPT | Mod: GP | Performed by: PHYSICAL THERAPY ASSISTANT

## 2023-02-17 PROCEDURE — 250N000013 HC RX MED GY IP 250 OP 250 PS 637: Performed by: PHYSICIAN ASSISTANT

## 2023-02-17 PROCEDURE — 97530 THERAPEUTIC ACTIVITIES: CPT | Mod: GP | Performed by: PHYSICAL THERAPY ASSISTANT

## 2023-02-17 PROCEDURE — 96372 THER/PROPH/DIAG INJ SC/IM: CPT | Performed by: PHYSICIAN ASSISTANT

## 2023-02-17 PROCEDURE — 36415 COLL VENOUS BLD VENIPUNCTURE: CPT | Performed by: PHYSICIAN ASSISTANT

## 2023-02-17 PROCEDURE — 82947 ASSAY GLUCOSE BLOOD QUANT: CPT | Performed by: PHYSICIAN ASSISTANT

## 2023-02-17 PROCEDURE — 97165 OT EVAL LOW COMPLEX 30 MIN: CPT | Mod: GO

## 2023-02-17 PROCEDURE — 250N000013 HC RX MED GY IP 250 OP 250 PS 637: Performed by: SPECIALIST/TECHNOLOGIST

## 2023-02-17 PROCEDURE — 250N000011 HC RX IP 250 OP 636: Performed by: SPECIALIST/TECHNOLOGIST

## 2023-02-17 PROCEDURE — 250N000011 HC RX IP 250 OP 636: Performed by: ORTHOPAEDIC SURGERY

## 2023-02-17 PROCEDURE — 97535 SELF CARE MNGMENT TRAINING: CPT | Mod: GO

## 2023-02-17 PROCEDURE — 250N000012 HC RX MED GY IP 250 OP 636 PS 637: Performed by: PHYSICIAN ASSISTANT

## 2023-02-17 PROCEDURE — 97116 GAIT TRAINING THERAPY: CPT | Mod: GP | Performed by: PHYSICAL THERAPY ASSISTANT

## 2023-02-17 PROCEDURE — 258N000003 HC RX IP 258 OP 636: Performed by: ORTHOPAEDIC SURGERY

## 2023-02-17 RX ORDER — OXYCODONE HYDROCHLORIDE 5 MG/1
5 TABLET ORAL EVERY 4 HOURS PRN
Qty: 33 TABLET | Refills: 0 | Status: SHIPPED | OUTPATIENT
Start: 2023-02-17 | End: 2023-05-03

## 2023-02-17 RX ORDER — AMLODIPINE BESYLATE 10 MG/1
10 TABLET ORAL DAILY
Status: DISCONTINUED | OUTPATIENT
Start: 2023-02-18 | End: 2023-02-17 | Stop reason: HOSPADM

## 2023-02-17 RX ORDER — AMOXICILLIN 250 MG
1-2 CAPSULE ORAL 2 TIMES DAILY
Qty: 30 TABLET | Refills: 0 | Status: SHIPPED | OUTPATIENT
Start: 2023-02-17 | End: 2023-05-03

## 2023-02-17 RX ORDER — CEFADROXIL 500 MG/1
500 CAPSULE ORAL 2 TIMES DAILY
Qty: 20 CAPSULE | Refills: 0 | Status: SHIPPED | OUTPATIENT
Start: 2023-02-17 | End: 2023-02-17

## 2023-02-17 RX ORDER — ASPIRIN 81 MG/1
81 TABLET ORAL 2 TIMES DAILY
Qty: 60 TABLET | Refills: 0 | Status: SHIPPED | OUTPATIENT
Start: 2023-02-17 | End: 2023-05-03

## 2023-02-17 RX ORDER — ACETAMINOPHEN 325 MG/1
650 TABLET ORAL EVERY 4 HOURS PRN
Qty: 100 TABLET | Refills: 0 | Status: SHIPPED | OUTPATIENT
Start: 2023-02-17 | End: 2023-05-03

## 2023-02-17 RX ORDER — CEFADROXIL 500 MG/1
500 CAPSULE ORAL 2 TIMES DAILY
Qty: 20 CAPSULE | Refills: 0 | Status: SHIPPED | OUTPATIENT
Start: 2023-02-17 | End: 2023-05-03

## 2023-02-17 RX ORDER — LABETALOL HYDROCHLORIDE 5 MG/ML
10 INJECTION, SOLUTION INTRAVENOUS
Status: DISCONTINUED | OUTPATIENT
Start: 2023-02-17 | End: 2023-02-17 | Stop reason: HOSPADM

## 2023-02-17 RX ORDER — HYDRALAZINE HYDROCHLORIDE 20 MG/ML
10 INJECTION INTRAMUSCULAR; INTRAVENOUS EVERY 4 HOURS PRN
Status: DISCONTINUED | OUTPATIENT
Start: 2023-02-17 | End: 2023-02-17 | Stop reason: HOSPADM

## 2023-02-17 RX ADMIN — OXYCODONE HYDROCHLORIDE 5 MG: 5 TABLET ORAL at 09:08

## 2023-02-17 RX ADMIN — KETOROLAC TROMETHAMINE 15 MG: 15 INJECTION, SOLUTION INTRAMUSCULAR; INTRAVENOUS at 06:15

## 2023-02-17 RX ADMIN — SENNOSIDES AND DOCUSATE SODIUM 1 TABLET: 50; 8.6 TABLET ORAL at 08:58

## 2023-02-17 RX ADMIN — ACETAMINOPHEN 975 MG: 325 TABLET, FILM COATED ORAL at 09:08

## 2023-02-17 RX ADMIN — FAMOTIDINE 20 MG: 20 TABLET, FILM COATED ORAL at 08:58

## 2023-02-17 RX ADMIN — ACETAMINOPHEN 975 MG: 325 TABLET, FILM COATED ORAL at 03:15

## 2023-02-17 RX ADMIN — LOSARTAN POTASSIUM 25 MG: 25 TABLET, FILM COATED ORAL at 08:58

## 2023-02-17 RX ADMIN — POLYETHYLENE GLYCOL 3350 17 G: 17 POWDER, FOR SOLUTION ORAL at 08:58

## 2023-02-17 RX ADMIN — ASPIRIN 81 MG: 81 TABLET, COATED ORAL at 08:58

## 2023-02-17 RX ADMIN — CEFAZOLIN 3 G: 10 INJECTION, POWDER, FOR SOLUTION INTRAVENOUS at 03:15

## 2023-02-17 RX ADMIN — INSULIN ASPART 2 UNITS: 100 INJECTION, SOLUTION INTRAVENOUS; SUBCUTANEOUS at 08:58

## 2023-02-17 RX ADMIN — ALLOPURINOL 200 MG: 100 TABLET ORAL at 08:58

## 2023-02-17 ASSESSMENT — ACTIVITIES OF DAILY LIVING (ADL)
ADLS_ACUITY_SCORE: 20

## 2023-02-17 NOTE — PROGRESS NOTES
Orthopedic Surgery  Bill Daugherty  2023  Admit Date:  2023  POD 1 Day Post-Op  S/P Procedure(s):  RIGHT TOTAL KNEE ARTHROPLASTY    Patient is feeling good, feels urination is improving.  Pain controlled, remembers how to handle pain control from his last knee replacement several years  ago.  Tolerating oral intake. Discussed his xrays and the anomaly noted in the tibia from the post-op films.  Discharge instructions reviewed.      Alert and orient to person, place, and time.  Vital Sign Ranges  Temperature Temp  Av  F (36.7  C)  Min: 97.6  F (36.4  C)  Max: 98.3  F (36.8  C)   Blood pressure Systolic (24hrs), Av , Min:117 , Max:168        Diastolic (24hrs), Av, Min:61, Max:82      Pulse Pulse  Av.9  Min: 57  Max: 95   Respirations Resp  Avg: 15.5  Min: 10  Max: 29   Pulse oximetry SpO2  Av.5 %  Min: 90 %  Max: 98 %       Left knee edema wear is CDI and adjusted to fit more appropriately. Minimal erythema of the surrounding skin.  Bilateral calves are soft, non-tender.  left lower extremity is NVI.    Labs:  Recent Labs   Lab Test 23  1033 22  1229   POTASSIUM 4.1 4.2     No results for input(s): HGB in the last 31511 hours.  No results for input(s): INR in the last 87465 hours.  Recent Labs   Lab Test 23  1033          A/P  1. S/p left TKA      Urinary retention - improving and already on flomax   Continue aspirin 81mg bid for DVT prophylaxis.     Mobilize with PT/OT WBAT.     Continue current pain regimen - minimize as able.    2. Disposition   Anticipate d/c to home today with wife, Artem updated regarding imaging findings and will review images.    Kjerstin L Foss, PA-C

## 2023-02-17 NOTE — PROCEDURES
OPERATIVE REPORT - TKA    DATE OF SERVICE:  2/16/2023    ATTENDING: CECILIA ZARAGOZA    SURGEON:  CECILIA ZARAGOZA    ASSISTANT:    Tanya Aguero PA-C    PREOPERATIVE DIAGNOSIS:  Right knee osteoarthritis    POSTOPERATIVE DIAGNOSIS:   Same     PROCEDURES PERFORMED:   Right Total Knee Arthroplasty.      ANESTHESIA:  1. General  2. Adductor canal block    ESTIMATED BLOOD LOSS:   25 mL    TRANSFUSIONS:  none    FLUIDS:   Per anesthesia    SPECIMENS:  Arthroplasty resection materials.    DRAIN:  None    COMPLICATIONS:  None    TOURNIQUET TIME:  50 minutes at 210 mmHg    INDICATIONS:   The patient is a very pleasant 72 year old male who has had persistent complaints of knee pain for some time now. The pain interferes with activities of daily living including sitting, standing, and ambulating short distances. The physical examination showed a painful and limited range of motion of the right knee.  The x-ray showed bone-on-bone arthritis of the right knee.     The patient has tried nonoperative measures to control their pain including Tylenol, oral anti-inflammatories, activity modification, low impact exercises, assistive devices, injections, none of which have provided satisfactory pain relief. The patient desires joint replacement of the knee to improve their lifestyle and reduce their pain.      Preoperatively, the risks, benefits, and possible complications of the procedure were discussed. The risks discussed included but were not limited to wear, loosening, infection, neurovascular damage, thromboembolic disease, foot drop, limb length inequality, persistent pain, stiffness and dislocation. All of the questions were satisfactorily answered and the patient wished to proceed with joint replacement surgery to improve their lifestyle and reduce their pain.       IMPLANTS:  1. Femoral component:  Sena Triathlon CR Right size 6  2. Tibial component: Sena Triathlon [place] size 6   3. Poly insert:  Sena Triathlon X3 CS  11 mm  4. Patella: Sena Triathlon 36 x 10 mm symmetric  5. Bone cement:  Simplex with tobramycin    PROCEDURE  The patient was brought to the operating room after adequate induction of Anesthesia. The patient had a tourniquet placed on the thigh and the lower extremity were prepped and draped free in the usual sterile fashion using a Betadine scrub and ChloraPrep paint.    At this point a time-out procedure was carried out to identify the patient, the appropriate operative side, the implants, the code status, the fire risk, the preoperative medications and to confirm that the patient received 3 grams of ancef within one hour of the onset of the procedure.  Following completion of this, we proceeded with the case    The leg was elevated and exsanguinated, flexed to 90 degrees and the tourniquet was inflated to 210 mmHg. A midline incision was performed. This allowed creation of full thickness subcutaneous flaps.  A midvastus arthrotomy was used.  The meniscal tibial ligaments were released as feasible medially and laterally. The medial collateral ligament was subperiosteally elevated to the origin of the semimembranosus. The accessible anterior menisci were resected. The retropatellar fat pad was partially excised. The patella was dislocated into the lateral gutter and the knee was flexed to 90 degrees.     With the knee at 90 degrees flexion, the femoral intramedullary guide ash was placed and set for 5 degrees of valgus and impacted into position. The distal femoral resection was made.  The femur was sized to a 6, the 4-in-1 block was pinned in place with the appropriate amount of external rotation matching the transepicondylar axis and perpendicular to Montgomery's line.  The anterior posterior and chamfer osteotomies were completed. The residual osteophytes were removed from the periphery of the femur.     The tibia stabilized in an anterior subluxed position and neutral sagittal alignment. The intramedullary  cutting apparatus was placed down. We pinned the block in place, performed the proximal tibial resection using the oscillating saw and cutting block with 3 degrees of posterior inclination.  The tibial resection was checked with the intramedullary based checking apparatus and rasp. The tibia was sized to a 6. We put the tibial component in the appropriate amount of external rotation with the center between the middle and medial thirds of the tibial tuberosity. The proximal tibia was filled with morselized bone graft to permit cement extravasation.     The posterior osteophytes were resected using the curved half inch osteotome. The patella was everted. The patellar osteotomy, free hand, using the oscillating saw. Multiple quadrants were checked multiple times until the appropriate thickness was confirmed with the caliper.  The patella sized for a 36 mm patella. The patella was drilled through the patellar template.    The trial components were placed. The leg came out to full extension and was nicely balanced with an 11 mm CS insert.  The knee was stable to varus and valgus stress in full extension. and mid flexion.  Our patellar tracking was checked.  The patella showed no tilt or subluxation and the patella engaging both condyles at 90 degrees. Satisfied with the patellar alignment, without any need for further releases, we removed the trial components. We prepared the bony surface for cementing.    The bony surfaces of the femur, tibia and patella were prepared for cementing.  Pulse irrigation was used on the femur, tibia and patella. The surfaces were then gauze dried. The femur, tibia and patella were cemented in place using vacuum mixed cement. Alignment, Range of Motion, Stability and Patellar tracking were appropriate.    The wound was closed in a layered fashion. The skin was brought together, everted and approximated with staples. Sterile dressings were applied. The patient was awakened and transported  postanesthesia care in stable condition at the end of the case. Sponge and needle counts were correct.     Hemostasis was obtained throughout the procedure and prior to the closure of each layer using electrocautery. Pulsatile irrigation and dilute betadine irrigation were used throughout the procedure. Surgical Body Exhaust Systems and high exchange air flow were used during the procedure. The patient received 3 grams of ancef prior to tourniquet inflation and within 1 hours of the start of the procedure for antibiotic prophylaxis.    POSTOPERATIVE PLAN  1. WBAT on the right lower extremity.   2. Antibiotic Prophylaxis were given 3 grams of ancef. Antibiotics were given within 1 hour of surgical incision and discontinued within 24 hours.  3. Pain control with Oxycodone and Tylenol  4. Follow up with Dr. Mcgill in 10-14 days. [phone].   5.  DVT prophylaxis aspirin and sequential compression devices will be started within 24 hours of surgery.  6. Plan discharge when meeting therapy goals and patient is medically stable  7. Caution with NSAID usage.    Jesse Mcgill MD  [facility]  [phone]  [phone]  Primary Care Physician System, Provider Not In

## 2023-02-17 NOTE — PROVIDER NOTIFICATION
DATE:  2/17/2023   TIME OF RECEIPT FROM LAB:  0845  LAB TEST:  XR result  LAB VALUE:  1.5 x 0.7 cm nonspecific, circumscribed lucent lesion in the proximal tibial diaphysis  RESULTS GIVEN WITH READ-BACK TO (PROVIDER):  Kjerstin Foss  TIME LAB VALUE REPORTED TO PROVIDER:   0859

## 2023-02-17 NOTE — PROGRESS NOTES
[facility]  Chart Check Note - Hospitalist Service     Assessment & Plan  Bill Daugherty is a 72 year old male with a past medical history significant for obesity, DALLAS on CPAP, T2DM, HTN, BPH, pituitary macroadenoma admitted on 2/16/2023 following R TKA. Hospitalist service was asked to see the patient postoperatively for medical management of DM, HTN.      OA s/p R TKA 2/16/2023  Procedure was performed by Dr. Mcgill under general anesthesia with EBL 25ml. There were no intraoperative complications.  --post op cares per Orthopedic service  --PT/OT  --aggressive IS  --hgb in the am      Steroid induced hyperglycemia  T2DM  HbA1C 7%. Received 10mg dexamethasone intra op  PTA: alogliptin 12.5mg daily, jardiance 12.5mg daily   --hold PTA oral medications  --medium intensity sliding scale insulin as needed   --mod carb diet     Recent Labs   Lab 02/17/23  0748 02/17/23  0724 02/17/23  0148 02/16/23  2203 02/16/23  1755 02/16/23  1033   * 185* 202* 199* 174* 140*        DALLAS on CPAP  Obesity  May have some obesity hypoventilation syndrome as well.   --continue CPAP with home settings @ hs and with naps  --continuous pulse oximetry and capno monitoring per protocol      HTN  PTA: losartan 25mg daily, amlodipine 10mg daily  --continue PTA amlodipine and losartan with hold parameters, restart CCB POD #2     BPH  Reports issues with retention after previous surgeries. Required straight cath for 1200mL evening of surgery and improving.  --Continue PTA flomax.   --Monitor UOP, PVRs per protocol      Hx gout: Continue PTA allopurinol      Pituitary adenoma, non functioning: Plan for surgical resection in May. Can follow up outpatient as planned    PTA Med rec done. Hospitalist will sign off. Call with questions/concerns.    BENITO Huerta, PA-C  Hospitalist [facility]  [facility]  Securely message with the Vocera Web Console (learn more here)  Text page via [employer]  Paging/Directory

## 2023-02-17 NOTE — UTILIZATION REVIEW
Admission Status; Secondary Review Determination    Under the authority of the Utilization Management Committee, the utilization review process indicated a secondary review on the above patient. The review outcome is based on review of the medical records, discussions with staff, and applying clinical experience noted on the date of the review.    (x) Outpatient Status with extended recovery is appropriate - This patient does not meet hospital inpatient criteria. If this patient's primary payer is Medicare and was admitted as an inpatient, Condition Code 44 should be used and patient status changed to outpatient recovery.    RATIONALE FOR DETERMINATION:    72 year old man who underwent a Right Total Knee Arthroplasty.  . Patient was admitted to the hospital after the procedure. Patient has Medicare Advantage and the procedure is not on the CMS inpatient list. No documented complications or unexpected recovery. Patient can be safely monitored for bleeding and recover in outpatient/extended recovery setting.    The severity of illness, intensity of service provided, expected LOS and risk for adverse outcome doesn't meet inpatient hospital admission.    This document was produced using voice recognition software.    The information on this document is developed by the utilization review team in order for the business office to ensure compliance. This only denotes the appropriateness of proper admission status and does not reflect the quality of care rendered.    The definitions of Inpatient Status and Observation Status used in making the determination above are those provided in the CMS Coverage Manual, Chapter 1 and Chapter 6, section 70.4.    Sincerely,    Jared Tamayo MD  Utilization Review  Physician Advisor  [facility].

## 2023-02-17 NOTE — PROGRESS NOTES
02/17/23 0930   Appointment Info   Signing Clinician's Name / Credentials (OT) Pati Riley OTR/L   Living Environment   People in Home spouse   Current Living Arrangements house   Home Accessibility stairs to enter home;stairs within home   Number of Stairs, Main Entrance 10   Stair Railings, Main Entrance railings safe and in good condition   Transportation Anticipated family or friend will provide   Living Environment Comments Pt lives with spouse who will not be able to assist physically. Lives in multi-level home, 10 SANYA, pt planning on staying on first level, has walk-in shower.   Self-Care   Usual Activity Tolerance good   Current Activity Tolerance moderate   Equipment Currently Used at Home none   Fall history within last six months no   Activity/Exercise/Self-Care Comment Ind ADL, IADL, mobilities   General Information   Onset of Illness/Injury or Date of Surgery 02/16/23   Referring Physician Tanya Aguero PA-C   Patient/Family Therapy Goal Statement (OT) return home   Additional Occupational Profile Info/Pertinent History of Current Problem POD ! R TKA   Existing Precautions/Restrictions fall   Right Lower Extremity (Weight-bearing Status) weight-bearing as tolerated (WBAT)   Cognitive Status Examination   Orientation Status orientation to person, place and time   Affect/Mental Status (Cognitive) WNL   Follows Commands WNL   Pain Assessment   Patient Currently in Pain No   Bed Mobility   Comment (Bed Mobility) Mod I   Transfers   Transfers sit-stand transfer;toilet transfer   Sit-Stand Transfer   Sit-Stand [place] (Transfers) supervision   Assistive Device (Sit-Stand Transfers) walker, front-wheeled   Toilet Transfer   Type (Toilet Transfer) sit-stand;stand-sit   Assistive Device (Toilet Transfer) walker, 4-wheeled;grab bars/safety frame   Activities of Daily Living   BADL Assessment/Intervention lower body dressing;toileting   Lower Body Dressing Assessment/Training   [place] Level  (Lower Body Dressing) set up;modified independence   Toileting   [place] Level (Toileting) supervision   Clinical Impression   Criteria for Skilled Therapeutic Interventions Met (OT) Yes, treatment indicated   OT Diagnosis decreased IADL performance   OT Problem List-Impairments impacting ADL problems related to;activity tolerance impaired;balance   Assessment of Occupational Performance 1-3 Performance Deficits   Identified Performance Deficits HH mgmt, exercise skills   Planned Therapy Interventions (OT) ADL retraining;home program guidelines;progressive activity/exercise;risk factor education;IADL retraining   Clinical Decision Making Complexity (OT) low complexity   OT Total Evaluation Time   OT Eval, Low Complexity Minutes (42902) 15   Therapy Certification   Start of Care Date 02/17/23   Certification date from 02/17/23   Certification date to 02/18/23   Medical Diagnosis R TKA   OT Goals   Therapy Frequency (OT) One time eval and treatment   OT Predicted Duration/Target Date for Goal Attainment 02/17/23   OT: Understanding of cardiac education to maximize quality of life, condition management, and health outcomes Patient   Self-Care/Home Management   Self-Care/Home Mgmt/ADL, Compensatory, Meal Prep Minutes (89737) 25   Treatment Detail/Skilled Intervention Training provided in modified stratgies for functional transfers, self cares, safe walker use and household task management. Following verbal and visual instruction pt return demonstrated understanding completing full body dressing w/retrieval of clothing using walker safe manner, toileting task safe manner as well.   OT Discharge Planning   OT Brief overview of current status OT eval and treatment completed - all goals met.

## 2023-02-17 NOTE — CONSULTS
Care Management Initial Consult    General Information  Assessment completed with: Patient,    Type of CM/SW Visit: Offer D/C Planning    Primary Care Provider verified and updated as needed: Yes   Readmission within the last 30 days:        Reason for Consult: discharge planning  Advance Care Planning:            Communication Assessment  Patient's communication style: spoken language (English or Bilingual)    Hearing Difficulty or Deaf: no   Wear Glasses or Blind: no    Cognitive  Cognitive/Neuro/Behavioral: WDL                      Living Environment:   People in home: spouse     Current living Arrangements:        Able to return to prior arrangements:         Family/Social Support:  Care provided by: self, child(dana)  Provides care for:    Marital Status:              Description of Support System:           Current Resources:   Patient receiving home care services: No     Community Resources:    Equipment currently used at home: none  Supplies currently used at home:      Employment/Financial:  Employment Status:          Financial Concerns:             Lifestyle & Psychosocial Needs:  Social Determinants of Health     Tobacco Use: Medium Risk     Smoking Tobacco Use: Former     Smokeless Tobacco Use: Never     Passive Exposure: Not on file   Alcohol Use: Not on file   Financial Resource Strain: Not on file   Food Insecurity: Not on file   Transportation Needs: Not on file   Physical Activity: Not on file   Stress: Not on file   Social Connections: Not on file   Intimate Partner Violence: Not on file   Depression: Not on file   Housing Stability: Not on file       Functional Status:  Prior to admission patient needed assistance:              Mental Health Status:          Chemical Dependency Status:                Values/Beliefs:  Spiritual, Cultural Beliefs, Druze Practices, Values that affect care:                 Additional Information:  Met with patient to discuss role in discharge planning. Pt  lives indep with wife.    [facility] had met with patient and discussed HHPT.  Prearranged with [facility].   Pt in agreement with this plan.  Voices no other needs for discharge.   Care Management Discharge Note    Discharge Date: 02/17/2023       Discharge Disposition: Home, Home Care    Discharge Services:      Discharge DME:      Discharge Transportation: family or friend will provide    Private pay costs discussed: Not applicable    PAS Confirmation Code:    Patient/family educated on Medicare website which has current facility and service quality ratings:      Education Provided on the Discharge Plan:    Persons Notified of Discharge Plans: Pt/[facility]  Patient/Family in Agreement with the Plan: yes    Handoff Referral Completed: No    Additional Information:  Orders sent to [facility] via [facility]    Bedside RN to review AVS        Kasia Henderson, RN

## 2023-02-17 NOTE — PLAN OF CARE
A/O x4. VSS. Pain managed with scheduled tylenol and prn oxycodone. Discussed discharge instructions with pt, verbalized understanding. Pt discharged to home with belongings. Transportation provided by daughter.

## 2023-02-17 NOTE — PLAN OF CARE
[facility]      OUTPATIENT OCCUPATIONAL THERAPY  EVALUATION  PLAN OF TREATMENT FOR OUTPATIENT REHABILITATION  (COMPLETE FOR INITIAL CLAIMS ONLY)  Patient's Last Name, First Name, M.I.  YOB: 1950  Bill Daugherty                          Provider's Name  [facility] Medical Record No.  [id number]                               Onset Date:  02/16/23   Start of Care Date:  02/17/23     Type:     ___PT   _X_OT   ___SLP Medical Diagnosis:  R TKA                        OT Diagnosis:  decreased IADL performance   Visits from SOC:  1   _________________________________________________________________________________  Plan of Treatment/Functional Goals    Planned Interventions: ADL retraining, home program guidelines, progressive activity/exercise, risk factor education, IADL retraining   Goals: See Occupational Therapy Goals on Care Plan in [facility] electronic health record.    Therapy Frequency: One time eval and treatment  Predicted Duration of Therapy Intervention: 02/17/23  _________________________________________________________________________________    I CERTIFY THE NEED FOR THESE SERVICES FURNISHED UNDER        THIS PLAN OF TREATMENT AND WHILE UNDER MY CARE     (Physician co-signature of this document indicates review and certification of the therapy plan).              Certification date from: 02/17/23, Certification date to: 02/18/23    Referring Physician: Tanya Aguero PA-C            Initial Assessment        See Occupational Therapy evaluation dated 02/17/23 in Epic electronic health record.

## 2023-02-17 NOTE — PROGRESS NOTES
02/16/23 1700   Appointment Info   Signing Clinician's Name / Credentials (PT) Keith Camara DPT   Rehab Comments (PT) WBAT, ROM as bryant   Living Environment   People in Home spouse   Current Living Arrangements house   Home Accessibility stairs to enter home;stairs within home   Number of Stairs, Main Entrance 10   Stair Railings, Main Entrance railings safe and in good condition   Transportation Anticipated family or friend will provide   Living Environment Comments Pt lives with spouse who will not be able to assist physically. Lives in multi-level home, 10 SANYA, pt planning on staying on first level   Self-Care   Usual Activity Tolerance good   Current Activity Tolerance moderate   Equipment Currently Used at Home none   Fall history within last six months no   Activity/Exercise/Self-Care Comment Pt IND with all mobility and ADL's at baseline   General Information   Onset of Illness/Injury or Date of Surgery 02/16/23   Referring Physician Tanya Aguero PA-C   Patient/Family Therapy Goals Statement (PT) go home   Pertinent History of Current Problem (include personal factors and/or comorbidities that impact the POC) Pt is a 72 y.o. male s/p Right TKA on 12/21/2023, POD#0   Existing Precautions/Restrictions fall;weight bearing;other (see comments)  (ROM as bryant)   Weight-Bearing Status - RLE weight-bearing as tolerated   Cognition   Affect/Mental Status (Cognition) WNL   Orientation Status (Cognition) oriented x 4   Follows Commands (Cognition) WNL   Pain Assessment   Patient Currently in Pain Yes, see Vital Sign flowsheet  (2/10 right knee)   Integumentary/Edema   Integumentary/Edema Comments right knee covered in dressing and sleeve   Posture    Posture Not impaired   Range of Motion (ROM)   Range of Motion ROM deficits secondary to surgical procedure;ROM deficits secondary to pain;Knee, Right (Group)   ROM Comment deficits with right knee after surgery, otherwise appears grossly WFL   Right Knee  Extension/Flexion ROM   Right Knee Extension/Flexion AROM - degrees 0-90   Strength (Manual Muscle Testing)   Strength (Manual Muscle Testing) Able to perform R SLR;Able to perform L SLR;Deficits observed during functional mobility   Strength Comments not formally assessed, deficits with right knee, appears grossly antigravity   Bed Mobility   Comment, (Bed Mobility) supine<>sit SBA   Transfers   Comment, (Transfers) sit<>stand with FWW CGA   Gait/Stairs (Locomotion)   [place] Level (Gait) contact guard   Assistive Device (Gait) walker, front-wheeled   Distance in Feet 10'   Distance in Feet (Gait) 200'   Pattern (Gait) step-through   Deviations/Abnormal Patterns (Gait) antalgic;gait speed decreased;maryellen decreased;stride length decreased;weight shifting decreased   Maintains Weight-bearing Status (Gait) able to maintain   Balance   Balance Comments normal sitting balance, good standing balance with FWW, impaired dynamic balance after TKA   Sensory Examination   Sensory Perception Comments pt reports loss of sensation in left dorsal area of foot from previous knee surgery   Coordination   Coordination no deficits were identified   Muscle Tone   Muscle Tone no deficits were identified   Clinical Impression   Criteria for Skilled Therapeutic Intervention Yes, treatment indicated   PT Diagnosis (PT) impaired gait   Influenced by the following impairments pain, deficits with ROM, strength, balance   Functional limitations due to impairments impaired bed mobility, transfers, amb, ADL's   Clinical Presentation (PT Evaluation Complexity) Stable/Uncomplicated   Clinical Presentation Rationale PMH and clinical judgement   Clinical Decision Making (Complexity) low complexity   Planned Therapy Interventions (PT) balance training;bed mobility training;cryotherapy;gait training;home exercise program;patient/family education;ROM (range of motion);stair training;strengthening;stretching;transfer training;progressive  activity/exercise   Anticipated Equipment Needs at Discharge (PT)   (pt will provide FWW and SEC)   Risk & Benefits of therapy have been explained evaluation/treatment results reviewed;care plan/treatment goals reviewed;risks/benefits reviewed;current/potential barriers reviewed;participants voiced agreement with care plan;participants included;patient   PT Total Evaluation Time   PT Eval, Low Complexity Minutes (05119) 10   Plan of Care Review   Plan of Care Reviewed With patient   Physical Therapy Goals   PT Frequency 2x/day   PT Predicted Duration/Target Date for Goal Attainment 02/17/23   PT Goals Bed Mobility;Transfers;Gait;Stairs   PT: Bed Mobility Independent;Supine to/from sit;Rolling;Bridging   PT: Transfers Modified independent;Sit to/from stand;Assistive device   PT: Gait Modified independent;Rolling walker;150 feet   PT: Stairs 5 stairs;Modified independent   Interventions   Interventions Quick Adds Gait Training;Therapeutic Activity;Therapeutic Procedure   Therapeutic Procedure/Exercise   Ther. Procedure: strength, endurance, ROM, flexibillity Minutes (49645) 5   Symptoms Noted During/After Treatment none   Treatment Detail/Skilled Intervention Educated on post surgical benefits of TE on circulation, functional ROM, and strength. With pt in supine cued for AP's x 20, on RLE: quad sets x 5, heel slides x 5. Pt tolerated all TE well and had mild increase in pain   Therapeutic Activity   Therapeutic Activities: dynamic activities to improve functional performance Minutes (38709) 10   Symptoms Noted During/After Treatment Increased pain   Treatment Detail/Skilled Intervention Greeted pt supine in bed. Eval completed. Educated on orders for WBAT, ROM to flexion tolerance, and importance of keeping knee straight when resting and demonstrated how to do so. With HOB elevated supine>sit SBA, with HOB flat sit>supine SBA, cues for set up and sequencing. Pt able to reposition in bed IND. Sit<>stand with FWW CGA,  cues for safe walker and hand placement and pt able to demo back well. Stand<>sit from toilet with FWW and use of grab bar SBA, cues for set up and sequencing, pt with poor eccentric control at bottom of sit. Pt able to void and IND with pericares. Increased time for line management and room set up   Gait Training   Gait Training Minutes (97810) 10   Symptoms Noted During/After Treatment (Gait Training) fatigue;increased pain   Treatment Detail/Skilled Intervention Pt amb into hallway with FWW CGA progressed to SBA, used step-through pattern and able to demo back equal step length quickly after vcs. Cues for safe walker placement and to push walker forward on wheels as not lift off floor to advance. Pt moved well overall and had no overt LOB. Fatigued afterwards. Encouraged pt to walk with nursing as able   [place] Level (Gait Training) stand-by assist   Physical Assistance Level (Gait Training) supervision;verbal cues;nonverbal cues (demo/gestures)   Weight Bearing (Gait Training) weight-bearing as tolerated   Assistive Device (Gait Training) rolling walker   Gait Analysis Deviations decreased maryellen;increased time in double stance;decreased stride length;decreased swing-to-stance ratio;decreased step length;decreased velocity of limb motion;decreased weight-shifting ability   Impairments (Gait Analysis/Training) pain;strength decreased   PT Discharge Planning   PT Plan review/progress HEP, progress gait distance, bed mobility, safe transfers, steps   PT Discharge Recommendation (DC Rec)   (defer to ortho)   PT Rationale for DC Rec Pt below baseline but moving well and using safe technique. Anticipate pt will progress and by discharge be at/near IND bed mobility, Mod-I transfers with FWW, Mod-I amb with FWW, Mod-I for steps. Pt will have good 24/7 support at home from spouse but she cannot help physically so pt should be Mod-I with all mobility to be safe at home   PT Brief overview of current status bed  mobility SBA, sit<>stand with FWW SBA, amb with FWW SBA   Total Session Time   Timed Code Treatment Minutes 25   Total Session Time (sum of timed and untimed services) 35

## 2023-02-17 NOTE — PLAN OF CARE
PT-  Pt discharged home today with assist of family as needed.  Plans to have Home PT.  PT goals partially met.

## 2023-02-17 NOTE — PLAN OF CARE
[place] EMERGENCY DEPARTMENT ([facility])  3/23/18 ED 14   History     Chief Complaint   Patient presents with     Headache     HPI  Carlos Alberto Garcia is a 31 year old male who resents to the ER requesting his monthly antipsychotic injection.  Patient states that he has ongoing hallucinations both auditory and visual but states they have not changed in frequency.  Patient states he simply needs a place to sleep.  Patient denies any new problems other than he would like his injection.  Epic records reveal no injection that he is treated with to the best of my knowledge.  Patient denies any fevers any vomiting any chest pain shortness of breath abdominal pain diarrhea melena or bright blood per rectum.    I have reviewed the Medications, Allergies, Past Medical and Surgical History, and Social History in the Epic system.    Past Medical History:   Diagnosis Date     Bipolar 2 disorder (H)      Chemical abuse     cocaine, meth, cambis     Dyslipidemia      Hep C w/ coma, chronic (H)      Patient overweight      Schizoaffective disorder      Unspecified essential hypertension      Unspecified hypothyroidism      There are no discharge medications for this patient.    No Known Allergies     Social History     Social History     Marital status: Single     Spouse name: N/A     Number of children: N/A     Years of education: N/A     Occupational History     Not on file.     Social History Main Topics     Smoking status: Current Every Day Smoker     Packs/day: 1.00     Types: Cigarettes     Smokeless tobacco: Current User     Alcohol use No     Drug use: 3.00 per week     Special: Marijuana, Methamphetamines      Comment: used meth and marijauna yesterday      Sexual activity: Yes     Partners: Female     Birth control/ protection: None     Other Topics Concern     Parent/Sibling W/ Cabg, Mi Or Angioplasty Before 65f 55m? No     Social History Narrative     Past Surgical History:   Procedure Laterality Date      Goal Outcome Evaluation:       Orientation: A+Ox4    Vitals/Tele; BP elevated 152/82, on 3L bipap/cpap on NOC    IV Access/drains: PIV, SL    Diet: Regular diet    Mobility; A1 with gait belt and walker     GI/: Unable to void, bladder scanned for 852, straight cath 1200. Able to void 100 ml this AM, post void bladder scan 334.    Wound/Skin R knee incision     Consults: PT, OT    Discharge Plan: Pending       See Flow sheets for assessment                    HAND SURGERY      L     No family history on file.    Review of Systems   All other systems reviewed and are negative.      Physical Exam   BP: 121/75  Pulse: 85  Temp: 97.9  F (36.6  C)  Weight: 108.9 kg (240 lb 1.3 oz)  SpO2: 99 %      Physical Exam   Constitutional: He is oriented to person, place, and time.   Laying supine on a cart responding to verbal stimulus moving all extremities and has a patent airway   Eyes: EOM are normal. Pupils are equal, round, and reactive to light.   Neck: Neck supple.   Cardiovascular: Normal heart sounds.    Pulmonary/Chest: Breath sounds normal.   Abdominal: Soft. There is no tenderness.   Musculoskeletal: He exhibits no deformity.   Neurological: He is alert and oriented to person, place, and time.   Grossly intact and symmetric   Skin: No rash noted.   Psychiatric:   Somewhat passive aggressive and difficult to assess       ED Course     ED Course     Procedures          Epic records were reviewed to find no monthly medication the patient is getting by injection.    Assessments & Plan (with Medical Decision Making)     I have reviewed the nursing notes.    Patient's care restrictions were brought up on [facility] and it was discussed with the patient is to what his restrictions are and whether or not he needs to be transferred.  Social service was called and got involved but by the time they arrived to the ER the patient had left AMA.  He apparently told nursing personnel that he did not want to stick around for any further evaluation or treatment and left refusing to sign the AMA form.    I have reviewed the findings, diagnosis, plan and need for follow up with the patient.    There are no discharge medications for this patient.      Final diagnoses:   Adult behavior problem     Fabrice Fraser MD    3/23/2018   [facility], EMERGENCY DEPARTMENT     Fabrice Fraser MD  03/23/18 1249

## 2023-02-17 NOTE — PLAN OF CARE
Goal Outcome Evaluation:    Pt A&OX4, VSS on 2L NC. ASX1 with GB and Walker up to BR. CMS intact. CPAP at night. LR running 100 mL/hr. Due to void. Pain managed with scheduled tylenol and Toradol. Will continue to monitor.

## 2023-02-24 ENCOUNTER — DOCUMENTATION ONLY (OUTPATIENT)
Dept: OTHER | Facility: CLINIC | Age: 73
End: 2023-02-24

## 2023-04-08 NOTE — TELEPHONE ENCOUNTER
FUTURE VISIT INFORMATION      FUTURE VISIT INFORMATION:    Date: 5/3/23    Time: 12:30pm    Location: [facility]  REFERRAL INFORMATION:    Referring provider:  Richard Lim MD    Referring providers clinic:  [facility]    Reason for visit/diagnosis  Ref by Dr. Lim for Pituitary Adenoma    RECORDS REQUESTED FROM:       Clinic name Comments Records Status Imaging Status   [facility] neuro 11/30/22- note with Richard Lim MD epic    imaging 5/25/23- CTA Head - schedule   5/25/23- CT Head - schedule  Epic

## 2023-04-17 ENCOUNTER — TELEPHONE (OUTPATIENT)
Dept: NEUROSURGERY | Facility: CLINIC | Age: 73
End: 2023-04-17

## 2023-04-17 NOTE — TELEPHONE ENCOUNTER
Called patient to discuss upcoming surgery. Relayed to patient that per neurosurgery and endocrinology care teams, they do not feel that there is a strong need for surgery at this point and would be appropriate to wait and observe, if he wishes. Patient states that he is completing an updated MRI this Saturday 4/22 and wishes to proceed with this prior to having any further discussion regarding surgical planning. Patient states that he does not know all risks associated with surgery but feels that he would like to proceed with surgery prior to developing symptoms from his tumor. He asked for a call back to discuss once new imaging has been reviewed by his care team. He will plan to meet with Dr. Phillips for sinusitis regardless of surgical decision. Patient has no other questions at this time.

## 2023-05-02 ENCOUNTER — CARE COORDINATION (OUTPATIENT)
Dept: NEUROSURGERY | Facility: CLINIC | Age: 73
End: 2023-05-02

## 2023-05-02 LAB
ABO/RH(D): NORMAL
ANTIBODY SCREEN: NEGATIVE
SPECIMEN EXPIRATION DATE: NORMAL

## 2023-05-02 NOTE — PROGRESS NOTES
Writer [facility]ed to [facility] request for imaging and reports MRI to be pushed to [facility] and report faxed. Confirmed with patient imaging completed.     Catrina Garza LPN  Neurosurgery

## 2023-05-03 ENCOUNTER — PRE VISIT (OUTPATIENT)
Dept: SURGERY | Facility: CLINIC | Age: 73
End: 2023-05-03

## 2023-05-03 ENCOUNTER — PRE VISIT (OUTPATIENT)
Dept: OTOLARYNGOLOGY | Facility: CLINIC | Age: 73
End: 2023-05-03

## 2023-05-03 ENCOUNTER — VIRTUAL VISIT (OUTPATIENT)
Dept: SURGERY | Facility: CLINIC | Age: 73
End: 2023-05-03
Payer: COMMERCIAL

## 2023-05-03 ENCOUNTER — ANESTHESIA EVENT (OUTPATIENT)
Dept: SURGERY | Facility: CLINIC | Age: 73
End: 2023-05-03

## 2023-05-03 ENCOUNTER — LAB (OUTPATIENT)
Dept: LAB | Facility: CLINIC | Age: 73
End: 2023-05-03
Payer: COMMERCIAL

## 2023-05-03 ENCOUNTER — OFFICE VISIT (OUTPATIENT)
Dept: NEUROSURGERY | Facility: CLINIC | Age: 73
End: 2023-05-03
Payer: COMMERCIAL

## 2023-05-03 ENCOUNTER — OFFICE VISIT (OUTPATIENT)
Dept: OTOLARYNGOLOGY | Facility: CLINIC | Age: 73
End: 2023-05-03
Payer: COMMERCIAL

## 2023-05-03 VITALS — SYSTOLIC BLOOD PRESSURE: 132 MMHG | OXYGEN SATURATION: 98 % | HEART RATE: 60 BPM | DIASTOLIC BLOOD PRESSURE: 75 MMHG

## 2023-05-03 VITALS
DIASTOLIC BLOOD PRESSURE: 75 MMHG | OXYGEN SATURATION: 98 % | HEIGHT: 72 IN | HEART RATE: 60 BPM | BODY MASS INDEX: 37.25 KG/M2 | WEIGHT: 275 LBS | SYSTOLIC BLOOD PRESSURE: 132 MMHG

## 2023-05-03 DIAGNOSIS — D35.2 PITUITARY ADENOMA (H): Primary | ICD-10-CM

## 2023-05-03 DIAGNOSIS — J32.2 CHRONIC ETHMOIDAL SINUSITIS: ICD-10-CM

## 2023-05-03 DIAGNOSIS — J32.1 CHRONIC FRONTAL SINUSITIS: ICD-10-CM

## 2023-05-03 DIAGNOSIS — E11.9 TYPE 2 DIABETES MELLITUS WITHOUT COMPLICATION, WITHOUT LONG-TERM CURRENT USE OF INSULIN (H): ICD-10-CM

## 2023-05-03 DIAGNOSIS — Z98.890 HISTORY OF SINUS SURGERY: ICD-10-CM

## 2023-05-03 DIAGNOSIS — Z01.818 PREOP EXAMINATION: ICD-10-CM

## 2023-05-03 DIAGNOSIS — D35.2 PITUITARY ADENOMA WITH EXTRASELLAR EXTENSION (H): ICD-10-CM

## 2023-05-03 DIAGNOSIS — E66.01 MORBID OBESITY (H): ICD-10-CM

## 2023-05-03 DIAGNOSIS — D35.2 PITUITARY ADENOMA WITH EXTRASELLAR EXTENSION (H): Primary | ICD-10-CM

## 2023-05-03 DIAGNOSIS — Z01.818 PREOP EXAMINATION: Primary | ICD-10-CM

## 2023-05-03 LAB
ANION GAP SERPL CALCULATED.3IONS-SCNC: 9 MMOL/L (ref 7–15)
BASOPHILS # BLD AUTO: 0.1 10E3/UL (ref 0–0.2)
BASOPHILS NFR BLD AUTO: 1 %
BUN SERPL-MCNC: 21.2 MG/DL (ref 8–23)
CALCIUM SERPL-MCNC: 10.3 MG/DL (ref 8.8–10.2)
CHLORIDE SERPL-SCNC: 103 MMOL/L (ref 98–107)
CREAT SERPL-MCNC: 1.24 MG/DL (ref 0.67–1.17)
DEPRECATED HCO3 PLAS-SCNC: 28 MMOL/L (ref 22–29)
EOSINOPHIL # BLD AUTO: 0.5 10E3/UL (ref 0–0.7)
EOSINOPHIL NFR BLD AUTO: 5 %
ERYTHROCYTE [DISTWIDTH] IN BLOOD BY AUTOMATED COUNT: 15.9 % (ref 10–15)
GFR SERPL CREATININE-BSD FRML MDRD: 62 ML/MIN/1.73M2
GLUCOSE SERPL-MCNC: 117 MG/DL (ref 70–99)
HCT VFR BLD AUTO: 43.2 % (ref 40–53)
HGB BLD-MCNC: 14.1 G/DL (ref 13.3–17.7)
IMM GRANULOCYTES # BLD: 0.1 10E3/UL
IMM GRANULOCYTES NFR BLD: 1 %
LYMPHOCYTES # BLD AUTO: 1.9 10E3/UL (ref 0.8–5.3)
LYMPHOCYTES NFR BLD AUTO: 21 %
MCH RBC QN AUTO: 27.9 PG (ref 26.5–33)
MCHC RBC AUTO-ENTMCNC: 32.6 G/DL (ref 31.5–36.5)
MCV RBC AUTO: 86 FL (ref 78–100)
MONOCYTES # BLD AUTO: 0.8 10E3/UL (ref 0–1.3)
MONOCYTES NFR BLD AUTO: 8 %
NEUTROPHILS # BLD AUTO: 6.1 10E3/UL (ref 1.6–8.3)
NEUTROPHILS NFR BLD AUTO: 64 %
NRBC # BLD AUTO: 0 10E3/UL
NRBC BLD AUTO-RTO: 0 /100
PLATELET # BLD AUTO: 289 10E3/UL (ref 150–450)
POTASSIUM SERPL-SCNC: 4.6 MMOL/L (ref 3.4–5.3)
RBC # BLD AUTO: 5.05 10E6/UL (ref 4.4–5.9)
SODIUM SERPL-SCNC: 140 MMOL/L (ref 136–145)
WBC # BLD AUTO: 9.4 10E3/UL (ref 4–11)

## 2023-05-03 PROCEDURE — 80048 BASIC METABOLIC PNL TOTAL CA: CPT | Performed by: PATHOLOGY

## 2023-05-03 PROCEDURE — 86850 RBC ANTIBODY SCREEN: CPT | Mod: 90 | Performed by: PATHOLOGY

## 2023-05-03 PROCEDURE — 99213 OFFICE O/P EST LOW 20 MIN: CPT | Performed by: PHYSICIAN ASSISTANT

## 2023-05-03 PROCEDURE — 99204 OFFICE O/P NEW MOD 45 MIN: CPT | Mod: VID | Performed by: PHYSICIAN ASSISTANT

## 2023-05-03 PROCEDURE — 36415 COLL VENOUS BLD VENIPUNCTURE: CPT | Performed by: PATHOLOGY

## 2023-05-03 PROCEDURE — 99000 SPECIMEN HANDLING OFFICE-LAB: CPT | Performed by: PATHOLOGY

## 2023-05-03 PROCEDURE — 86901 BLOOD TYPING SEROLOGIC RH(D): CPT | Mod: 90 | Performed by: PATHOLOGY

## 2023-05-03 PROCEDURE — 86900 BLOOD TYPING SEROLOGIC ABO: CPT | Mod: 90 | Performed by: PATHOLOGY

## 2023-05-03 PROCEDURE — 85025 COMPLETE CBC W/AUTO DIFF WBC: CPT | Performed by: PATHOLOGY

## 2023-05-03 PROCEDURE — 99204 OFFICE O/P NEW MOD 45 MIN: CPT | Mod: 25 | Performed by: OTOLARYNGOLOGY

## 2023-05-03 RX ORDER — MUPIROCIN 20 MG/G
OINTMENT TOPICAL PRN
COMMUNITY
Start: 2022-07-13

## 2023-05-03 RX ORDER — AMOXICILLIN 500 MG/1
CAPSULE ORAL
COMMUNITY
Start: 2023-03-01

## 2023-05-03 RX ORDER — GABAPENTIN 300 MG/1
300 CAPSULE ORAL
COMMUNITY
Start: 2022-04-06

## 2023-05-03 RX ORDER — TRIAMCINOLONE ACETONIDE 1 MG/G
OINTMENT TOPICAL PRN
COMMUNITY
Start: 2023-03-30

## 2023-05-03 ASSESSMENT — LIFESTYLE VARIABLES: TOBACCO_USE: 1

## 2023-05-03 ASSESSMENT — PAIN SCALES - GENERAL
PAINLEVEL: MILD PAIN (2)
PAINLEVEL: NO PAIN (0)
PAINLEVEL: MILD PAIN (2)

## 2023-05-03 ASSESSMENT — ENCOUNTER SYMPTOMS: SEIZURES: 0

## 2023-05-03 NOTE — PATIENT INSTRUCTIONS
You were seen in the ENT Clinic today by Dr. Phillips. If you have any questions or concerns after your appointment, please contact us (see below)       2.   Please return to the ENT clinic after surgery on June 7th.           How to Contact Us:  Send a [employer] message to your provider. Our team will respond to you via [employer]. Occasionally, we will need to call you to get further information.  For urgent matters (Monday-Friday), call the ENT Clinic: [phone] and speak with a call center team member - they will route your call appropriately.   If you'd like to speak directly with a nurse, please find our contact information below. We do our best to check voicemail frequently throughout the day, and will work to call you back within 1-2 days. For urgent matters, please use the general clinic phone numbers listed above.        Ruth Ann OJEDA RN  ENT RN Care Coordinator  Direct: [phone]  Milena ZHU LPN  Direct: [phone]         [facility]  Department of Otolaryngology

## 2023-05-03 NOTE — PATIENT INSTRUCTIONS
Preparing for Your Surgery      Name:  Bill Daugherty   MRN:  [id number]   :  1950   Today's Date:  5/3/2023       Arriving for surgery:  Surgery date:  23  Arrival time:  05:00 AM    Please come to:     [facility] [address]  [address] [place]  [address]    - ? parking is available in front of the hospital    -    Please proceed to [address] on the 3rd floor. [phone]?     - ?If you are in need of directions, wheelchair or escort please stop at the Information Desk in the lobby.  Inform the information person that you are here for surgery; a wheelchair and escort to [address] will be provided.?     What can I eat or drink?  -  You may eat and drink normally up to 8 hours prior to arrival time.   -  You may have clear liquids until 2 hours prior to arrival time.    Examples of clear liquids:  Water  Clear broth  Juices (apple, white grape, white cranberry  and cider) without pulp  Noncarbonated, powder based beverages  (lemonade and Bucky-Aid)  Sodas (Sprite, 7-Up, ginger ale and seltzer)  Coffee or tea (without milk or cream)  Gatorade    -  No Alcohol for at least 24 hours before surgery.     Which medicines can I take?    Hold Aspirin for 7 days before surgery.   Hold Multivitamins for 7 days before surgery.  Hold Supplements for 7 days before surgery.  Hold Ibuprofen (Advil, Motrin) for 1 day(s) before surgery--unless otherwise directed by surgeon.  Hold Naproxen (Aleve) for 4 days before surgery.    HOLD JARDIANCE X 3 DAYS BEFORE SURGERY.    -  DO NOT take these medications the day of surgery:  Losartan + metformin and alogliptin.    -  PLEASE TAKE these medications the day of surgery:  Tylenol if needed, take other morning medications.    How do I prepare myself?  - Please take 2 showers (one the night prior to surgery and one the morning of surgery) using Scrubcare or Hibiclens soap.    Use this soap only from the neck to  your toes.     Leave the soap on your skin for one minute--then rinse thoroughly.      You may use your own shampoo and conditioner. No other hair products.   - Please remove all jewelry and body piercings.  - No lotions, deodorants or fragrance.  - No makeup or fingernail polish.   - Bring your ID and insurance card.    -If you have a Deep Brain Stimulator, Spinal Cord Stimulator, or any Neuro Stimulator device---you must bring the remote control to the hospital.      ALL PATIENTS GOING HOME THE SAME DAY OF SURGERY ARE REQUIRED TO HAVE A RESPONSIBLE ADULT TO DRIVE AND BE IN ATTENDANCE WITH THEM FOR 24 HOURS FOLLOWING SURGERY.    Covid testing policy as of 12/06/2022  Your surgeon will notify and schedule you for a COVID test if one is needed before surgery--please direct any questions or COVID symptoms to your surgeon      Questions or Concerns:    - For any questions regarding the day of surgery or your hospital stay, please contact the Pre Admission Nursing Office at [phone].       - If you have health changes between today and your surgery, please call your surgeon.       - For questions after surgery, please call your surgeons office.           Current Visitor Guidelines       Surgeries and procedures: Adult patients can have 2 visitors all through the surgery process.     Visiting hours: 8 a.m. to 8:30 p.m.     Hospital: Adult patients and children under age 18 can have 4 visitor at a time       No visitors under the age of 5 are allowed for hospital patients.       Double occupancy rooms: Patients can have only two visitors at a time.       Patients confirmed or suspected to have symptoms of COVID 19 or flu:     No visitors allowed for adult patients.   Children (under age 18) can have 1 named visitor.     People who are sick or showing symptoms of COVID 19 or flu:    Are not allowed to visit patients--we can only make exceptions in special situations.       Please follow these guidelines for your  visit:          Please maintain social distance          Masking is optional--however at times you may be asked to wear a mask for the safety of yourself and others     Clean your hands with alcohol hand . Do this when you arrive at and leave the building and patient room,    And again after you touch your mask or anything in the room.     Go directly to and from the room you are visiting.     Stay in the patient s room during your visit. Limit going to other places in the hospital as much as possible     Leave bags and jackets at home or in the car.     For everyone s health, please don t come and go during your visit. That includes for smoking   during your visit.

## 2023-05-03 NOTE — PATIENT INSTRUCTIONS
Skull Base Surgery Instructions    PREPARING FOR SURGERY  You will need a preoperative assessment within 30 days of your surgery. We will set this up for you once we know your surgery date.  Before surgery, call the clinic:   If there is any change in your health, or you are having more frequent or severe symptoms   If you develop a cold or flu, or if you test positive for COVID-19   If you have any questions about what to expect before, during, or after surgery   If you need assistance filling out paperwork for short-term disability or FMLA, or you need a letter excusing you from work       MEDICATIONS BEFORE SURGERY  You will receive specific instructions about how to take your medications at your preoperative assessment visit. Talk to your care team about every medication that you take, including over-the-counter medications and supplements. Some medications can make you bleed too much during surgery, and some change how well anesthesia drugs work. Follow these general instructions for common medications, unless otherwise directed.     INSTRUCTIONS MEDICATION   Hold for 7 days before surgery  Supplements   Multivitamins   Aspirin (note: some medications can contain aspirin, like [place])  Naproxen (Aleve)  Ibuprofen (Advil, Motrin)     Talk with the Preoperative Assessment Center (PAC) about how to take these medications before surgery    Insulin or oral medications for diabetes   Blood pressure medications   Anticoagulant medications, including:    warfarin (Coumadin)   enoxaparin (Lovenox)   dabigatran (Pradaxa)   apixaban (Eliquis)   rivaroxaban (Xarelto)   Antiplatelet medications, including:   clopidogrel bisulfate (Plavix)   cilostazol (Pletal)      Okay to keep taking for pain, as needed    Acetaminophen (Tylenol)        EATING AND DRINKING BEFORE SURGERY  Eat and drink as usual until 8 hours before your surgery arrival time. After that, no food or milk.   Drink clear liquids until 1 hour before your  surgery arrival time. Clear liquids are liquids you can see through, like water, Gatorade, and Propel. You may also have black coffee and tea (no cream or milk).   Nothing by mouth within 1 hour of your surgery arrival time. This includes gum, candy, and breath mints.   If your care team tells you take medicine on the morning of surgery, it is okay to take medicine with a sip of water.   Do not drink alcohol for at least 24 hours before surgery. Do not use marijuana for 7 days prior to surgery.      PREVENTING INFECTION  Shower or bathe the night before and morning of your surgery following the instructions on your handout,  Showering Before Surgery.    Note: Only use the special antiseptic soap from your neck to your toes. Your care team will clean the skin at your surgery site.   Don t shave or clip hair near your surgery site. We ll remove the hair if needed.   Having diabetes and/or smoking can cause delayed wound healing and increase your risk of a surgical site infection. Quitting smoking and lowering your blood sugars can make a big difference. If you would like support with this, please let us know or work with your primary care provider.        SMOKING AND NICOTINE  Don t smoke or vape the morning of surgery. You may chew nicotine gum up to 2 hours before surgery. A nicotine patch is okay.   We strongly recommend quitting smoking and other tobacco products. Nicotine can cause delayed healing and wound complications after surgery.       PLANNING AHEAD - FINANCES  [email or web address]  [facility] Billing: Please call [phone], if you wish to pay a bill.  [facility] Financial Counselors: Please call [phone], if you have questions about possible costs and coverage or to discuss options if you don't have enough - or no - insurance for your care.  [facility] Cost of Care Estimates: Please visit the website to view our online estimate  "tool. If you have additional questions, call [phone] for estimates.  Our financial team will contact your insurance company as soon as surgery is scheduled. If your insurance company requires a prior authorization (pre-approval), our financial team will complete these necessary steps. Typically, we don t encounter many issues with insurance denying coverage for skull base surgery.    It is a good idea to call your insurance company and let them know you re having surgery. Ask questions about your deductible, co-insurance, and what of out-of-pocket costs you will be responsible for.   [employer]: You might hear about a company called [employer], with whom we contract to provide monitoring of your nerves during surgery (called \"intraoperative neuromonitoring\"). You may receive a letter from [employer] after your surgery, and this company and letter are legitimate. For questions and more information, please visit: [email or web address]      COVID-19 AND VISITOR RESTRICTIONS  Please visit [email or web address] for the latest information about hospital visitor restrictions.      HEALTHCARE DIRECTIVE  Consider preparing a Health Care Directive and choosing a Health Care Agent. A Health Care Directive is a written plan outlining your values and priorities for your future medical treatment. A Health Care Agent makes health care decisions based on your wishes if you are unable to communicate.   Download a document, information materials or register for a free class on advance care planning and creating a health care directive by visiting [email or web address], calling [phone], or emailing [email or web address].   If you have a health care directive or choose to complete a healthcare directive before surgery, please upload the document to [employer]. This will be attached to your chart. You may also want to " "bring a copy with you on the day of surgery.       YOUR SURGERY DAY  Parking:   Both self-park and  parking (24 hours, 7 days a week) are available at the [facility] building. Self-park and  rates are the same. If the Patient Visitors Ramp is full or if a patient or visitor has limited mobility, please follow the signs to the  located at the main entrance. For more information, please visit: [email or web address]    Due to safety concerns around COVID-19, [facility] cannot offer  services to all patients at this time. However, we do still offer  services for patients with mobility limitations.   Imaging:  Your surgeons may want to do a CT scan on the day of surgery. This is most common with endoscopic endonasal surgery (through your nose). Sometimes, you will need to have small stickers or markers, called fiducials, placed on your head for the scan. This gives your surgeons a \"map\" of the surgical area, and helps your surgeons navigate around important structures during the surgery. If you will need fiducials, you will have small areas of hair shaved so the markers will stick to your head. These markers are temporary, and will come off after your surgery.  What to bring:  Photo ID and insurance card   Copy of your healthcare directive (if you have one)   Glasses with case (you can t wear contacts during surgery)   Hearing aids with case   A few personal items (pack lightly)   Cell phone and    Inhaler (if you use)   Eye drops (if you use)   If you have a pacemaker, ICD (defibrillator) or other implant, please bring the ID card   If you have an implanted stimulator, please bring the remote control   If you have a legal guardian, bring a copy of the certified (court-stamped) guardianship papers   What to leave at home:  Please remove any jewelry, including body piercings   Leave jewelry and other valuables at home       HOSPITAL STAY  On " average, your hospital stay will be between 3-5 days. It could be shorter or longer depending on your specific procedure, your health, and your recovery. The first 24 hours of your hospital stay will typically be in the Intensive Care Unit (ICU). You will be monitored closely and may have some of the following: Intravenous (IV) fluids and medications, a catheter which drains your urine, or a lumbar drain (a small catheter in your low back).  After the ICU, you will be moved to a regular hospital bed/floor. The rest of your post-operative recovery will focus on your individual needs which may include: helping with balance, nausea, swallowing, bowel management, pain, and incision care.       HOME RECOVERY  Everyone's recovery is different based on their health condition, age, and overall health status.   Plan to have an adult stay with you for at least 24 hours after you get home from the hospital.   Arrange for help at home. It is common to feel tired for the first few weeks (maybe months) and you will need to avoid some activities. Many people find that having someone help with household tasks, such as cleaning, cooking, and running errands is helpful.    Make your home safe by removing tripping hazards and moving items you need to a place where you can easily reach them.        ACTIVITY RESTRICTIONS  Avoid strenuous exercise and activity for 6-12 weeks.   Do not lift anything greater than 10 pounds (about a gallon of milk).  Extra pressure in your head can disrupt the healing of the internal surgical area and cause complications. Avoid the following activities that may increase the pressure in your head:  Avoid bending over with your head below your heart  Avoid heavy lifting or straining  Avoid lifting with your arms above your shoulders  Take care to prevent constipation, as this can lead to straining  Avoid drinking through straws  Avoid blowing your nose  Try to cough and sneeze with your mouth  open      SLEEPING  Plan to sleep with your head elevated (at least 30 degrees) for at least 2 weeks after surgery. You may find that sleeping in a recliner is helpful and more comfortable. This is especially important if you have sleep apnea and are unable to use your CPAP after surgery.  Please let your care team know if you have sleep apnea. You will not be able to use your CPAP for at least 2 weeks after surgery, sometimes longer. We will monitor your breathing closely while you are in the hospital.      INCISION CARE - CRANIOTOMY (HEAD INCISION)  Usually, you will have a gauze bandage over your incision after surgery. Follow any instructions that are given to you at the time of discharge from the hospital.  Usually, you will need to avoid getting the incision wet until after the sutures are removed (at your 2-week follow up appointment).   If your surgeon tells you that you can shower before this, avoid soaking the incision or submerging it under water, and be very gentle (do not scrub the incision).  If you wear glasses and your incision is around your ear:  Avoid wearing glasses for at least 2 weeks after surgery. Your glasses can irritate the incision and can also affect the height of your ear (by weighing your ear down) if you've had a fat graft.  Check your incision every day. Call the clinic if you notice any of the following:  Increasing redness  Swelling or bogginess (fluid collection around / under the incision)  Drainage  Separation of wound edges  Fever      INCISION CARE - OTHER  Depending on your surgery, you might also have an incision on your upper leg / thigh or your abdomen. Sometimes, these incisions are covered with small strips of tape, called steri-strips. If you have steri-strips, allow these to peel off on their own.  If you had a lumbar drain during surgery, you will have a small, dissolvable suture in your low back.       PAIN MANAGEMENT  It is normal to have some pain after surgery.  Most people do not experience severe pain. If you are experiencing severe pain at the incision site or severe headaches, please let us know right away.  You will usually be sent home with a small amount of narcotic pain medication. You should gradually reduce the amount of pain medication that you take as your pain improves.  Avoid ibuprofen (Advil) or naproxen (Aleve) immediately after surgery, unless your surgeon tells you this is okay to take.   It is okay to take acetaminophen (Tylenol) for pain. You can take Tylenol with the narcotic pain medication, and some people find benefit in alternating between the narcotic pain medication and Tylenol. (Example: 1 tablet of oxycodone at 8:00 am, 1 tablet of Tylenol at 10:00 am).      DIET  A well-balanced diet is important for your recovery.   Try to eat plenty of fiber (fruits, whole grains, beans, and vegetables can be good sources of fiber) to help prevent constipation.   It is important to stay hydrated after surgery to prevent dehydration and help with bowel movements. Drink plenty of water throughout the day.      WORK AND FMLA / SHORT TERM DISABILITY  Most people take 6-12 weeks off work. This will depend on the type of work that you do and the surgery you are having.  We are happy to complete short term disability or FMLA paperwork for you. Please send a [email or web address] message (you can attach a document) with the paperwork that you need completed. Please let us know where you'd like for us to send the paperwork. We can send it directly to your leave  or employer, with your permission, or we can send it back to you.  Please allow at least 5-7 business days for paperwork completion and processing.       CALL THE CLINIC IF YOU EXPERIENCE:  Drainage, swelling, fluid collection, or increased redness around the incision   Fever, or temperature of 101 degrees or higher   Stiff neck or extra sensitivity to light   Clear nasal drainage which you did not have before  surgery, or a new salty/metallic taste  Increase in facial weakness   Vision changes  Pain not managed by your pain medication   Severe constipation or abdominal pain  Running low on prescription medication that was prescribed to you at the time of surgery   Any other symptoms that you have questions about    After clinic hours or on the weekends, please call the hospital  at [phone] and ask to speak with the Neurosurgery or ENT resident on call. If you have a serious emergency, call 911 or come to the emergency room. If you can, come to the hospital where you had your surgery.     During clinic hours:   Department  Phone    Ear, Nose, & Throat (ENT)    [phone]    Neurosurgery    [phone]     Skull Base RN Care Coordinators:  We do our best to check voicemail frequently throughout the day. For urgent matters, please use the general clinic phone numbers listed above. Your call will be routed appropriately.     Debra Nobles MA, RN, PHN, NB-[facility]   RN Care Coordinator & Skull Base    Direct: [phone]     Delmy Rosado, RN   ENT - Dr. Griffin Green   Direct: [phone]     Marcy Moore, FRANK   Neurosurgery - Dr. Lim   Direct: [phone]      Ruth Ann Melvin RN   ENT - Dr. Prakash Weinberg   Direct: [phone]

## 2023-05-03 NOTE — PROGRESS NOTES
[facility]  New Patient Visit      Encounter date:   May 3, 2023    Referring Provider:   Richard Lim MD  [address] [place] [address]    Chief Complaint: pituitary adenoma     History of Present Illness:   Bill Daugherty is a 72 year old male who presents for consultation regarding pituitary adenoma. He also has CRS and [facility], with significant improvement in sinonasal symptoms.     Pituitary adenoma was being observed, but given interval growth, the decision has been made to move forward with surgery.     Uncertain if septoplasty was performed during sinonasal surgery. I don't have access to outside ENT clinic notes.     Sino-Nasal Outcome Test (SNOT - 22)  DNT    [place] [facility]:  [facility] - [facility]    Review of systems: A 14-point review of systems has been conducted and was negative for any notable symptoms, except as dictated in the history of present illness.     Medical History:  Past Medical History:   Diagnosis Date     Bilateral low back pain with left-sided sciatica      BPH (benign prostatic hyperplasia)      Diabetes mellitus, type 2 (H)      Gout      History of pancreatitis      HLP (hyperkeratosis lenticularis perstans)      HTN (hypertension)      Nephrolithiasis      Obesity      DALLAS (obstructive sleep apnea)     uses cpap     Pituitary macroadenoma (H)      Pulmonary nodules      PVD (posterior vitreous detachment), unspecified laterality      Shoulder pain      Steatosis of liver         Surgical History:   Past Surgical History:   Procedure Laterality Date     ARTHROPLASTY KNEE Right 2/16/2023    Procedure: RIGHT TOTAL KNEE ARTHROPLASTY;  Surgeon: Anseth, Scott Duane, MD;  Location: [facility] OR     CHOLECYSTECTOMY       ENDOSCOPIC RETROGRADE CHOLANGIOPANCREATOGRAPHY       septoplasty and turbinate reduction Bilateral      TOTAL KNEE ARTHROPLASTY Left         Family History:  No family history on file.      Social History:   Social History     Socioeconomic History     Marital status:    Tobacco Use     Smoking status: Former     Types: Cigarettes     Smokeless tobacco: Never   Vaping Use     Vaping status: Never Used     Passive vaping exposure: Yes   Substance and Sexual Activity     Alcohol use: Yes     Comment: rarely     Drug use: Never     Sexual activity: Never        Physical Exam:  Vital signs: There were no vitals taken for this visit.   General Appearance: No acute distress, appropriate demeanor, conversant  Eyes: moist conjunctivae; EOMI; pupils symmetric; visual acuity grossly intact; no proptosis  Head: normocephalic; overall symmetric appearance without deformity  Face: overall symmetric without deformity; HB I/VI  Ears: Normal appearance of external ear; external meatus normal in appearance; TMs intact without perforation bilaterally;   Nose: No external deformity; see nasal endoscopy  Oral Cavity/oropharynx: Normal appearance of mucosa; tongue midline; no mass or lesions; oropharynx without obvious mucosal abnormality  Neck: no palpable lymphadenopathy; thyroid without palpable nodules  Lungs: symmetric chest rise; no wheezing  CV: Good distal perfusion; normal hear rate  Extremities: No deformity  Neurologic Exam: Cranial nerves II-XII are grossly intact; no focal deficit    Procedure Note  Procedure performed: Rigid nasal endoscopy  Indication: To evaluate for sinonasal pathology not visualized on routine anterior rhinoscopy  Anesthesia: 4% topical lidocaine with 0.05% oxymetazoline  Description of procedure: A 30 degree, 3 mm rigid endoscope was inserted into bilateral nasal cavities and the nasal valves, nasal cavity, middle meatus, sphenoethmoid recess, and nasopharynx were thoroughly evaluated for evidence of obstruction, edema, purulence, polyps and/or mass/lesion.     Heron-Sancho Endoscopic Scoring System  Endoscopic observation Right Left   Polyps in middle meatus (0 = absent, 1 =  restricted to middle meatus, 2 = Beyond middle meatus) 0 0   Discharge (0 = absent, 1 = thin and clear, 2 = thick, purulent) 0 0   Edema (0 = absent, 1 = mild-moderate, 2 = moderate-severe) 0 0   Crusting (0 = absent, 1 = mild-moderate, 2 = moderate-severe) 0 0   Scarring (0= absent, 1 = mild-moderate, 2 = moderate-severe) 0 0   Total 0 0     Findings  RT: evid of prior surgery; min sinus disease  LT: evid of prior surgery; min sinus disease    Septum is palpated and I do not detect any absence of cartilage or bone, suggesting no septoplasty was done    The patient tolerated the procedure well without complication.     Laboratory Review:  n/a    Imaging Review:  Reviewed MRI from 4/22/2023:  evid of prior surgery  Lt max near complete max opacification  Fronto-ethmoid disease    Septum midline  Pituitary adenoma abuts optic chiasm      Pathology Review:  n/a    Assessment/Medical Decision Making:  Pituitary adenoma  CRS  History of ESS x2     I discussed the risks, benefits, and alternatives to EEA for sellar mass resection at length with the patient, including but not limited to: pain, bleeding, infection, CSF leak, vision changes or vision loss, need for revision of skull base reconstruction in the event of failure, sinusitis, septal perforation, changes in external appearance of the nose, protracted healing, need for continued post-operative care with regular debridement and sinus rinses, hyposmia, which may be temporary or permanent.  He was allowed to ask questions, which I answered to the best of my ability.  After this lengthy discussion, surgery was elected.       Plan:  Proceed with EEA for pit adenoma resection  RTC post-op    Shadi Phillips MD    [facility]  Rhinology  Endoscopic Skull Base Surgery  [facility]  Department of Otolaryngology - Head & Neck Surgery    Scribe Disclosure:  I, Burke Solis, am serving as a scribe to document services personally  performed by Shadi Phillips MD at this visit, based upon the provider's statements to me. All documentation has been reviewed by the aforementioned provider prior to being entered into the official medical record.

## 2023-05-03 NOTE — H&P
Pre-Operative H & P     CC:  Preoperative exam to assess for increased cardiopulmonary risk while undergoing surgery and anesthesia.    Date of Encounter: 5/3/2023  Primary Care Physician:  System, Provider Not In     Reason for Visit: Pituitary adenoma with extrasellar extension (H)    HPI  Bill Daugherty is a 73 y/o male who presents for pre-operative H&P in preparation for stealth assisted Endoscopic endonasal transsellar approach for tumor; possible INSERTION, DRAIN, SPINE, LUMBAR; possible fascia isael graft, possible abdominal fat graft with Richard Lim MD & Shadi Phillips MD on 5/25/23 at [facility] for treatment of Pituitary adenoma with extrasellar extension.     Patient is being evaluated for comorbid conditions of HTN, DALLAS, former tobacco use, pulmonary nodule, DM2, h/o kidney stone, morbid obesity, h/o pancreatitis, fatty liver, sciatica, s/p B/L knee arthroplasties, gout, BPH.    Mr. Daugherty has a history of sinusitis symptoms for which he underwent imaging that demonstrated an incidentally discovered pituitary adenoma.  He has undergone 3 prior functional endoscopic surgeries for sinus disease. He now presents for the above procedure.    History was obtained from patient & chart review.     Hx of abnormal bleeding or anti-platelet use: denies      Past Medical History  Past Medical History:   Diagnosis Date     Bilateral low back pain with left-sided sciatica      BPH (benign prostatic hyperplasia)      Diabetes mellitus, type 2 (H)      Gout      History of pancreatitis      HLP (hyperkeratosis lenticularis perstans)      HTN (hypertension)      Nephrolithiasis      Obesity      DALLAS (obstructive sleep apnea)     uses cpap     Pituitary macroadenoma (H)      Pulmonary nodules      PVD (posterior vitreous detachment), unspecified laterality      Shoulder pain      Steatosis of liver        Past Surgical History  Past Surgical History:   Procedure Laterality  Date     ARTHROPLASTY KNEE Right 2/16/2023    Procedure: RIGHT TOTAL KNEE ARTHROPLASTY;  Surgeon: Anseth, Scott Duane, MD;  Location: [facility] OR     CHOLECYSTECTOMY       ENDOSCOPIC RETROGRADE CHOLANGIOPANCREATOGRAPHY       septoplasty and turbinate reduction Bilateral      TOTAL KNEE ARTHROPLASTY Left        Prior to Admission Medications  Current Outpatient Medications   Medication Sig Dispense Refill     allopurinol (ZYLOPRIM) 100 MG tablet Take 200 mg by mouth daily (2 x 100 mg = 200 mg)       alogliptin (NESINA) 12.5 MG tablet Take 12.5 mg by mouth every morning       amLODIPine (NORVASC) 10 MG tablet Take 10 mg by mouth every morning       amoxicillin (AMOXIL) 500 MG capsule TAKE 4 CAPSULES BY MOUTH 1 HOUR BEFORE DENTAL APPOINTMENT       desonide (DESOWEN) 0.05 % external cream Apply topically 2 times daily as needed       emollient (VANICREAM) external cream Apply topically as needed (for dry skin)       empagliflozin (JARDIANCE) 25 MG TABS tablet Take 12.5 mg by mouth every morning (0.5 x 25 mg = 12.5 mg)       fluticasone (FLONASE) 50 MCG/ACT nasal spray Spray 2 sprays into both nostrils as needed       gabapentin (NEURONTIN) 300 MG capsule Take 300 mg by mouth nightly as needed       losartan (COZAAR) 25 MG tablet Take 25 mg by mouth every morning       Multiple Vitamin (MULTIVITAMIN PO) Take 1 tablet by mouth every morning       mupirocin (BACTROBAN) 2 % external ointment Apply topically as needed       oxymetazoline (AFRIN) 0.05 % nasal spray Spray 2 sprays into both nostrils 3 times daily as needed for congestion       sildenafil (VIAGRA) 100 MG tablet Take 100 mg by mouth as needed (1 hour before sexual activity)       sodium chloride (OCEAN) 0.65 % nasal spray Spray 2 sprays into both nostrils daily as needed for congestion       tamsulosin (FLOMAX) 0.4 MG capsule Take 0.4 mg by mouth every evening       triamcinolone (KENALOG) 0.1 % external ointment Apply topically as needed APPLY THIN LAYER TOPICALLY  TWICE A DAY APPLY TO RASH ON RIGHT CHEST TWICE DAILY UNTIL RASH RESOLVES         Allergies  Allergies   Allergen Reactions     Atorvastatin      Metformin      Other reaction(s): Gastrointestinal     Pravastatin      Other reaction(s): Pancreatitis     Chlorhexidine Gluconate Rash       Social History  Social History     Socioeconomic History     Marital status:      Spouse name: Not on file     Number of children: Not on file     Years of education: Not on file     Highest education level: Not on file   Occupational History     Not on file   Tobacco Use     Smoking status: Former     Types: Cigarettes     Quit date:      Years since quittin.3     Smokeless tobacco: Never   Vaping Use     Vaping status: Never Used     Passive vaping exposure: Yes   Substance and Sexual Activity     Alcohol use: Yes     Comment: rarely     Drug use: Never     Sexual activity: Never   Other Topics Concern     Not on file   Social History Narrative     Not on file     Social Determinants of Health     Financial Resource Strain: Not on file   Food Insecurity: Not on file   Transportation Needs: Not on file   Physical Activity: Not on file   Stress: Not on file   Social Connections: Not on file   Intimate Partner Violence: Not on file   Housing Stability: Not on file       Family History  Family History   Problem Relation Age of Onset     Pulmonary Embolism Father         post surgical     Anesthesia Reaction No family hx of        Review of Systems  The complete review of systems is negative other than noted in the HPI or here.     Anesthesia Evaluation   Pt has had prior anesthetic.     History of anesthetic complications   Urinary retention requiring catheter.    ROS/MED HX  ENT/Pulmonary:     (+) sleep apnea, uses CPAP, tobacco use, Past use,  (-) asthma and recent URI   Neurologic: Comment: Pituitary adenoma   (-) no seizures and no CVA   Cardiovascular:     (+) hypertension-----    METS/Exercise Tolerance: 4 -  Raking leaves, gardening Comment: Walks 2 miles regularly.     Hematologic:  - neg hematologic  ROS  (-) history of blood clots and history of blood transfusion   Musculoskeletal: Comment: Sciatica    S/p B/L knee arthropalsties (most recently Feb 2023)    Gout        GI/Hepatic: Comment: Hx pancreatitis x2 due to bile duct stone      (+) liver disease (Fatty liver),  (-) GERD   Renal/Genitourinary:     (+) Nephrolithiasis , BPH,  (-) renal disease   Endo:     (+) type II DM, Last HgA1c: 7.0, date: 2/1/23, Not using insulin, Obesity,     Psychiatric/Substance Use:  - neg psychiatric ROS     Infectious Disease:  - neg infectious disease ROS     Malignancy:  - neg malignancy ROS     Other:  - neg other ROS          Virtual visit -  No vitals were obtained    Physical Exam  Constitutional: Awake, alert, cooperative, no apparent distress, and appears stated age.  HENT: Normocephalic  Respiratory: non labored breathing   Neurologic: Awake, alert, oriented to name, place and time.   Neuropsychiatric: Calm, cooperative. Normal affect.      Prior Labs/Diagnostic Studies   All labs and imaging personally reviewed       The patient's records and results personally reviewed by this provider.     Labs today:  BMP, CBC, T&S, A1c    WBC Count 4.0 - 11.0 10e3/uL 9.4       RBC Count 4.40 - 5.90 10e6/uL 5.05      Hemoglobin 13.3 - 17.7 g/dL 14.1      Hematocrit 40.0 - 53.0 % 43.2      MCV 78 - 100 fL 86      MCH 26.5 - 33.0 pg 27.9      MCHC 31.5 - 36.5 g/dL 32.6      RDW 10.0 - 15.0 % 15.9 High       Platelet Count 150 - 450 10e3/uL 289  200     % Neutrophils % 64      % Lymphocytes % 21      % Monocytes % 8      % Eosinophils % 5      % Basophils % 1      % Immature Granulocytes % 1      NRBCs per 100 WBC <1 /100 0      Absolute Neutrophils 1.6 - 8.3 10e3/uL 6.1      Absolute Lymphocytes 0.8 - 5.3 10e3/uL 1.9      Absolute Monocytes 0.0 - 1.3 10e3/uL 0.8      Absolute Eosinophils 0.0 - 0.7 10e3/uL 0.5      Absolute Basophils 0.0 -  0.2 10e3/uL 0.1      Absolute Immature Granulocytes <=0.4 10e3/uL 0.1      Absolute NRBCs 10e3/uL 0.0         Sodium 136 - 145 mmol/L 140     141      Potassium 3.4 - 5.3 mmol/L 4.6   4.1   4.2     Chloride 98 - 107 mmol/L 103     102     Carbon Dioxide (CO2) 22 - 29 mmol/L 28     30 High      Anion Gap 7 - 15 mmol/L 9     9     Urea Nitrogen 8.0 - 23.0 mg/dL 21.2     20.7     Creatinine 0.67 - 1.17 mg/dL 1.24 High      1.17     Calcium 8.8 - 10.2 mg/dL 10.3 High      10.1     Glucose 70 - 99 mg/dL 117 High   185 High    140 High   119 High      GFR Estimate >60 mL/min/1.73m2 62            Assessment      Bill Daugherty is a 72 year old male seen as a PAC referral for risk assessment and optimization for anesthesia.    Plan/Recommendations  Pt will be optimized for the proposed procedure.  See below for details on the assessment, risk, and preoperative recommendations    NEUROLOGY  - No history of TIA, CVA or seizure    -Post Op delirium risk factors:  No risk identified    ENT  - No current airway concerns.  Will need to be reassessed day of surgery.  Mallampati: Unable to assess  TM: Unable to assess    CARDIAC  - HTN, will hold losartan, continue norvasc DOS  - Pituitary adenoma    - METS (Metabolic Equivalents)  Walks 2 miles regularly.    Patient performs 4 or more METS exercise without symptoms            Total Score: 0      RCRI-Low risk: Class 2 0.9% complication rate            Total Score: 1    RCRI: High Risk Surgery        PULMONARY  - DALLAS w/ CPAP    - Denies asthma or inhaler use  - Tobacco History      History   Smoking Status     Former     Types: Cigarettes     Quit date: 1980   Smokeless Tobacco     Never       GI  - Denies GERD   - Hx pancreatitis x2 due to bile duct stone    PONV Medium Risk  Total Score: 2           1 AN PONV: Patient is not a current smoker    1 AN PONV: Intended Post Op Opioids        /RENAL  - BPH  - Hx urinary retention following prior surgery, requiring catheter. Recommend  close monitoring of urine output during periop period.  - Creatinine today is 1.24      ENDOCRINE    - BMI: Estimated body mass index is 37.3 kg/m  as calculated from the following:    Height as of an earlier encounter on 5/3/23: 1.829 m (6').    Weight as of an earlier encounter on 5/3/23: 124.7 kg (275 lb).  Obesity (BMI >30)  - DM2, will hold Jardiance x3d prior, hold alogliptin DOS  - A1c today is pending (was 7.0 on 2/1/23)    HEME  VTE Medium Risk 1.8%            Total Score: 5    VTE: Greater than 59 yrs old    VTE: Family Hx of VTE      - No history of abnormal bleeding or antiplatelet use.      MSK  - Sciatica  - S/p B/L knee arthropalsties (most recently Feb 2023)  - Gout      The patient is aware that the final anesthesia plan will be decided by the assigned anesthesia provider on the date of service.    The patient is optimized for their procedure. AVS with information on surgery time/arrival time, meds and NPO status given by nursing staff. No further diagnostic testing indicated.    Please refer to the physical examination documented by the anesthesiologist in the anesthesia record on the day of surgery.    Video-Visit Details    Type of service:  Video Visit    Provider received verbal consent for a Video Visit from the patient? Yes   Video Start Time: 1337  Video End Time:1349    Originating Location (pt. Location): Other Exam room in PAC clinic    Distant Location (provider location):  Off-site  Mode of Communication:  Video Conference via [employer]Tell[employer]  On the day of service:     Prep time: 15 minutes  Visit time: 12 minutes  Documentation time: 14 minutes  ------------------------------------------  Total time: 41 minutes      Hoda Castrejon PA-C  Preoperative Assessment Center  [facility]  Clinic and Surgery Center  Phone: [phone]  Fax: [phone]

## 2023-05-03 NOTE — PROGRESS NOTES
[facility]  Department of Neurosurgery  Center for Skull Base and Pituitary Surgery    Name: Bill Daugherty  MRN: [id number]  Age: 72 year old  : 1950      Chief Complaint:   Pituitary adenoma, follow up visit    History of Present Illness:   Bill Daugherty is a 72 year old male with a history of HLD, gout, DALLSA (CPAP use since the ), osteoarthritis, hypertension, and DMII who is seen today for follow up regarding a pituitary adenoma which has been followed since it's discovery last November. He's here today for preoperative discussion and will meet with our ENT colleague, Dr. Boston Phillips, later today. He feels well. He has had no new symptoms since our last meeting. He had a follow up MRI done which showed slight growth of his known pituitary adenoma over 5 months. His surgery is scheduled for 2023 with Jeffery Lim and lAan.   He had right knee replacement surgery and has been working with PT, making progress. He still cannot fully flex the right knee but is walking without difficulty.       Review of Systems:   Pertinent items are noted in HPI or as in patient entered ROS below, remainder of complete ROS is negative.     Physical Exam:   /75   Pulse 60   SpO2 98%    General: No acute distress.    Eyes: Conjunctivae are normal. Crude testing of vision (Snellen) with 20/20 R, 20/32 L  MSK: Moves all extremities.  No obvious deformity.  Neuro: The patient is fully oriented. Speech is normal. Extraocular movements are intact without nystagmus. Facial nerve function is normal, rated as a House Brackmann 1. Gait is normal.  Psych: Normal mood and affect. Behavior is normal.      Imaging:  We reviewed the MRI from last month which shows known pituitary adenoma without influence on the optic chiasm which has had slight growth over 5 months, from 1.4 x 1.4 x 1.4 cm in November to 1.5 x 1.5 x 1.6 cm presently. No new abnormality seen.       Assessment:  Pituitary  adenoma, follow up visit    Plan:  We discussed pre-, gumaro-, and postoperative expectations today and answered his questions to the best of my ability about recovery and restrictions. He will meet with ENT next and discuss his CPap use in further detail. He was encouraged to reach out with new questions prior to surgery.        Diamond Garza PA-C  Department of Neurosurgery

## 2023-05-03 NOTE — PROGRESS NOTES
Bill is a 72 year old who is being evaluated via a billable video visit.      HPI           Review of Systems         Physical Exam

## 2023-05-04 PROBLEM — E66.01 MORBID OBESITY (H): Status: ACTIVE | Noted: 2023-05-04

## 2023-05-13 ENCOUNTER — HEALTH MAINTENANCE LETTER (OUTPATIENT)
Age: 73
End: 2023-05-13

## 2023-05-16 ENCOUNTER — CARE COORDINATION (OUTPATIENT)
Dept: NEUROSURGERY | Facility: CLINIC | Age: 73
End: 2023-05-16

## 2023-05-24 ENCOUNTER — ANESTHESIA EVENT (OUTPATIENT)
Dept: SURGERY | Facility: CLINIC | Age: 73
DRG: 615 | End: 2023-05-24
Payer: COMMERCIAL

## 2023-05-25 ENCOUNTER — HOSPITAL ENCOUNTER (INPATIENT)
Facility: CLINIC | Age: 73
LOS: 2 days | Discharge: HOME | DRG: 615 | End: 2023-05-27
Attending: NEUROLOGICAL SURGERY | Admitting: NEUROLOGICAL SURGERY
Payer: COMMERCIAL

## 2023-05-25 ENCOUNTER — ANESTHESIA (OUTPATIENT)
Dept: SURGERY | Facility: CLINIC | Age: 73
DRG: 615 | End: 2023-05-25
Payer: COMMERCIAL

## 2023-05-25 ENCOUNTER — HOSPITAL ENCOUNTER (OUTPATIENT)
Dept: CT IMAGING | Facility: CLINIC | Age: 73
Discharge: HOME | DRG: 615 | End: 2023-05-25
Attending: NEUROLOGICAL SURGERY | Admitting: NEUROLOGICAL SURGERY
Payer: COMMERCIAL

## 2023-05-25 DIAGNOSIS — D35.2 PITUITARY ADENOMA (H): Primary | ICD-10-CM

## 2023-05-25 DIAGNOSIS — D35.2 PITUITARY ADENOMA WITH EXTRASELLAR EXTENSION (H): ICD-10-CM

## 2023-05-25 LAB
GLUCOSE BLDC GLUCOMTR-MCNC: 123 MG/DL (ref 70–99)
GLUCOSE BLDC GLUCOMTR-MCNC: 172 MG/DL (ref 70–99)

## 2023-05-25 PROCEDURE — 360N000078 HC SURGERY LEVEL 5, PER MIN: Performed by: NEUROLOGICAL SURGERY

## 2023-05-25 PROCEDURE — 88307 TISSUE EXAM BY PATHOLOGIST: CPT | Mod: 26 | Performed by: SPECIALIST

## 2023-05-25 PROCEDURE — 30999 UNLISTED PROCEDURE NOSE: CPT | Mod: GC | Performed by: OTOLARYNGOLOGY

## 2023-05-25 PROCEDURE — 258N000003 HC RX IP 258 OP 636: Performed by: NEUROLOGICAL SURGERY

## 2023-05-25 PROCEDURE — 258N000003 HC RX IP 258 OP 636: Performed by: ANESTHESIOLOGY

## 2023-05-25 PROCEDURE — 370N000017 HC ANESTHESIA TECHNICAL FEE, PER MIN: Performed by: NEUROLOGICAL SURGERY

## 2023-05-25 PROCEDURE — 70450 CT HEAD/BRAIN W/O DYE: CPT | Mod: 26 | Performed by: RADIOLOGY

## 2023-05-25 PROCEDURE — 70498 CT ANGIOGRAPHY NECK: CPT

## 2023-05-25 PROCEDURE — 258N000003 HC RX IP 258 OP 636: Performed by: STUDENT IN AN ORGANIZED HEALTH CARE EDUCATION/TRAINING PROGRAM

## 2023-05-25 PROCEDURE — 09BM4ZZ EXCISION OF NASAL SEPTUM, PERCUTANEOUS ENDOSCOPIC APPROACH: ICD-10-PCS | Performed by: OTOLARYNGOLOGY

## 2023-05-25 PROCEDURE — 62165 REMOVE PITUIT TUMOR W/SCOPE: CPT | Mod: 62 | Performed by: OTOLARYNGOLOGY

## 2023-05-25 PROCEDURE — 88341 IMHCHEM/IMCYTCHM EA ADD ANTB: CPT | Mod: 26 | Performed by: SPECIALIST

## 2023-05-25 PROCEDURE — 61781 SCAN PROC CRANIAL INTRA: CPT | Performed by: NEUROLOGICAL SURGERY

## 2023-05-25 PROCEDURE — 250N000011 HC RX IP 250 OP 636: Performed by: ANESTHESIOLOGY

## 2023-05-25 PROCEDURE — 09UW47Z SUPPLEMENT RIGHT SPHENOID SINUS WITH AUTOLOGOUS TISSUE SUBSTITUTE, PERCUTANEOUS ENDOSCOPIC APPROACH: ICD-10-PCS | Performed by: OTOLARYNGOLOGY

## 2023-05-25 PROCEDURE — 4A1034Z MONITORING OF CENTRAL NERVOUS ELECTRICAL ACTIVITY, PERCUTANEOUS APPROACH: ICD-10-PCS | Performed by: NEUROLOGICAL SURGERY

## 2023-05-25 PROCEDURE — 250N000011 HC RX IP 250 OP 636: Performed by: STUDENT IN AN ORGANIZED HEALTH CARE EDUCATION/TRAINING PROGRAM

## 2023-05-25 PROCEDURE — 250N000025 HC SEVOFLURANE, PER MIN: Performed by: NEUROLOGICAL SURGERY

## 2023-05-25 PROCEDURE — 272N000480 CT HEAD W/O CONTRAST

## 2023-05-25 PROCEDURE — 250N000011 HC RX IP 250 OP 636: Performed by: NEUROLOGICAL SURGERY

## 2023-05-25 PROCEDURE — 8E09XBZ COMPUTER ASSISTED PROCEDURE OF HEAD AND NECK REGION: ICD-10-PCS | Performed by: NEUROLOGICAL SURGERY

## 2023-05-25 PROCEDURE — 250N000009 HC RX 250: Performed by: STUDENT IN AN ORGANIZED HEALTH CARE EDUCATION/TRAINING PROGRAM

## 2023-05-25 PROCEDURE — 09UW4JZ SUPPLEMENT RIGHT SPHENOID SINUS WITH SYNTHETIC SUBSTITUTE, PERCUTANEOUS ENDOSCOPIC APPROACH: ICD-10-PCS | Performed by: NEUROLOGICAL SURGERY

## 2023-05-25 PROCEDURE — 999N000141 HC STATISTIC PRE-PROCEDURE NURSING ASSESSMENT: Performed by: NEUROLOGICAL SURGERY

## 2023-05-25 PROCEDURE — 250N000009 HC RX 250: Performed by: ANESTHESIOLOGY

## 2023-05-25 PROCEDURE — 120N000002 HC R&B MED SURG/OB UMMC

## 2023-05-25 PROCEDURE — 70498 CT ANGIOGRAPHY NECK: CPT | Mod: 26 | Performed by: RADIOLOGY

## 2023-05-25 PROCEDURE — 250N000013 HC RX MED GY IP 250 OP 250 PS 637: Performed by: STUDENT IN AN ORGANIZED HEALTH CARE EDUCATION/TRAINING PROGRAM

## 2023-05-25 PROCEDURE — 70496 CT ANGIOGRAPHY HEAD: CPT

## 2023-05-25 PROCEDURE — C1763 CONN TISS, NON-HUMAN: HCPCS | Performed by: NEUROLOGICAL SURGERY

## 2023-05-25 PROCEDURE — 62165 REMOVE PITUIT TUMOR W/SCOPE: CPT | Mod: 62 | Performed by: NEUROLOGICAL SURGERY

## 2023-05-25 PROCEDURE — 88307 TISSUE EXAM BY PATHOLOGIST: CPT | Mod: TC | Performed by: NEUROLOGICAL SURGERY

## 2023-05-25 PROCEDURE — 272N000001 HC OR GENERAL SUPPLY STERILE: Performed by: NEUROLOGICAL SURGERY

## 2023-05-25 PROCEDURE — 250N000009 HC RX 250: Performed by: NEUROLOGICAL SURGERY

## 2023-05-25 PROCEDURE — 710N000009 HC RECOVERY PHASE 1, LEVEL 1, PER MIN: Performed by: NEUROLOGICAL SURGERY

## 2023-05-25 PROCEDURE — 0GB04ZZ EXCISION OF PITUITARY GLAND, PERCUTANEOUS ENDOSCOPIC APPROACH: ICD-10-PCS | Performed by: NEUROLOGICAL SURGERY

## 2023-05-25 PROCEDURE — 999N000248 HC STATISTIC IV INSERT WITH US BY RN

## 2023-05-25 PROCEDURE — 88342 IMHCHEM/IMCYTCHM 1ST ANTB: CPT | Mod: 26 | Performed by: SPECIALIST

## 2023-05-25 PROCEDURE — 70496 CT ANGIOGRAPHY HEAD: CPT | Mod: 26 | Performed by: RADIOLOGY

## 2023-05-25 DEVICE — GRAFT DURAGEN 2X2" ID220: Type: IMPLANTABLE DEVICE | Site: CRANIAL | Status: FUNCTIONAL

## 2023-05-25 RX ORDER — AMOXICILLIN 250 MG
1 CAPSULE ORAL 2 TIMES DAILY
Status: DISCONTINUED | OUTPATIENT
Start: 2023-05-25 | End: 2023-05-27 | Stop reason: HOSPADM

## 2023-05-25 RX ORDER — LIDOCAINE 40 MG/G
CREAM TOPICAL
Status: DISCONTINUED | OUTPATIENT
Start: 2023-05-25 | End: 2023-05-27 | Stop reason: HOSPADM

## 2023-05-25 RX ORDER — TAMSULOSIN HYDROCHLORIDE 0.4 MG/1
0.4 CAPSULE ORAL EVERY EVENING
Status: DISCONTINUED | OUTPATIENT
Start: 2023-05-25 | End: 2023-05-27 | Stop reason: HOSPADM

## 2023-05-25 RX ORDER — HYDROMORPHONE HYDROCHLORIDE 1 MG/ML
0.4 INJECTION, SOLUTION INTRAMUSCULAR; INTRAVENOUS; SUBCUTANEOUS EVERY 5 MIN PRN
Status: DISCONTINUED | OUTPATIENT
Start: 2023-05-25 | End: 2023-05-25 | Stop reason: HOSPADM

## 2023-05-25 RX ORDER — ACETAMINOPHEN 325 MG/1
650 TABLET ORAL EVERY 4 HOURS PRN
Status: DISCONTINUED | OUTPATIENT
Start: 2023-05-28 | End: 2023-05-27

## 2023-05-25 RX ORDER — OXYMETAZOLINE HYDROCHLORIDE 0.05 G/100ML
2 SPRAY NASAL 3 TIMES DAILY PRN
Status: DISCONTINUED | OUTPATIENT
Start: 2023-05-25 | End: 2023-05-27 | Stop reason: HOSPADM

## 2023-05-25 RX ORDER — GABAPENTIN 300 MG/1
300 CAPSULE ORAL
Status: DISCONTINUED | OUTPATIENT
Start: 2023-05-25 | End: 2023-05-27 | Stop reason: HOSPADM

## 2023-05-25 RX ORDER — ONDANSETRON 2 MG/ML
4 INJECTION INTRAMUSCULAR; INTRAVENOUS EVERY 6 HOURS PRN
Status: DISCONTINUED | OUTPATIENT
Start: 2023-05-25 | End: 2023-05-27 | Stop reason: HOSPADM

## 2023-05-25 RX ORDER — SODIUM CHLORIDE, SODIUM LACTATE, POTASSIUM CHLORIDE, CALCIUM CHLORIDE 600; 310; 30; 20 MG/100ML; MG/100ML; MG/100ML; MG/100ML
INJECTION, SOLUTION INTRAVENOUS CONTINUOUS
Status: DISCONTINUED | OUTPATIENT
Start: 2023-05-25 | End: 2023-05-25 | Stop reason: HOSPADM

## 2023-05-25 RX ORDER — LOSARTAN POTASSIUM 25 MG/1
25 TABLET ORAL EVERY MORNING
Status: DISCONTINUED | OUTPATIENT
Start: 2023-05-26 | End: 2023-05-27 | Stop reason: HOSPADM

## 2023-05-25 RX ORDER — FENTANYL CITRATE 50 UG/ML
INJECTION, SOLUTION INTRAMUSCULAR; INTRAVENOUS PRN
Status: DISCONTINUED | OUTPATIENT
Start: 2023-05-25 | End: 2023-05-25

## 2023-05-25 RX ORDER — IOPAMIDOL 755 MG/ML
100 INJECTION, SOLUTION INTRAVASCULAR ONCE
Status: COMPLETED | OUTPATIENT
Start: 2023-05-25 | End: 2023-05-25

## 2023-05-25 RX ORDER — CEFAZOLIN SODIUM/WATER 3 G/30 ML
3 SYRINGE (ML) INTRAVENOUS SEE ADMIN INSTRUCTIONS
Status: DISCONTINUED | OUTPATIENT
Start: 2023-05-25 | End: 2023-05-25 | Stop reason: HOSPADM

## 2023-05-25 RX ORDER — POLYETHYLENE GLYCOL 3350 17 G/17G
17 POWDER, FOR SOLUTION ORAL DAILY
Status: DISCONTINUED | OUTPATIENT
Start: 2023-05-26 | End: 2023-05-27 | Stop reason: HOSPADM

## 2023-05-25 RX ORDER — OXYCODONE HYDROCHLORIDE 5 MG/1
5 TABLET ORAL
Status: CANCELLED | OUTPATIENT
Start: 2023-05-25

## 2023-05-25 RX ORDER — CEFAZOLIN SODIUM/WATER 3 G/30 ML
3 SYRINGE (ML) INTRAVENOUS
Status: COMPLETED | OUTPATIENT
Start: 2023-05-25 | End: 2023-05-25

## 2023-05-25 RX ORDER — NALOXONE HYDROCHLORIDE 0.4 MG/ML
0.2 INJECTION, SOLUTION INTRAMUSCULAR; INTRAVENOUS; SUBCUTANEOUS
Status: DISCONTINUED | OUTPATIENT
Start: 2023-05-25 | End: 2023-05-27 | Stop reason: HOSPADM

## 2023-05-25 RX ORDER — LABETALOL HYDROCHLORIDE 5 MG/ML
10-40 INJECTION, SOLUTION INTRAVENOUS EVERY 10 MIN PRN
Status: DISCONTINUED | OUTPATIENT
Start: 2023-05-25 | End: 2023-05-27 | Stop reason: HOSPADM

## 2023-05-25 RX ORDER — SODIUM CHLORIDE, SODIUM LACTATE, POTASSIUM CHLORIDE, AND CALCIUM CHLORIDE .6; .31; .03; .02 G/100ML; G/100ML; G/100ML; G/100ML
IRRIGANT IRRIGATION PRN
Status: DISCONTINUED | OUTPATIENT
Start: 2023-05-25 | End: 2023-05-25 | Stop reason: HOSPADM

## 2023-05-25 RX ORDER — FENTANYL CITRATE 50 UG/ML
25 INJECTION, SOLUTION INTRAMUSCULAR; INTRAVENOUS EVERY 5 MIN PRN
Status: DISCONTINUED | OUTPATIENT
Start: 2023-05-25 | End: 2023-05-25 | Stop reason: HOSPADM

## 2023-05-25 RX ORDER — NALOXONE HYDROCHLORIDE 0.4 MG/ML
0.4 INJECTION, SOLUTION INTRAMUSCULAR; INTRAVENOUS; SUBCUTANEOUS
Status: DISCONTINUED | OUTPATIENT
Start: 2023-05-25 | End: 2023-05-27 | Stop reason: HOSPADM

## 2023-05-25 RX ORDER — NICOTINE POLACRILEX 4 MG
15-30 LOZENGE BUCCAL
Status: DISCONTINUED | OUTPATIENT
Start: 2023-05-25 | End: 2023-05-27 | Stop reason: HOSPADM

## 2023-05-25 RX ORDER — ONDANSETRON 4 MG/1
4 TABLET, ORALLY DISINTEGRATING ORAL EVERY 6 HOURS PRN
Status: DISCONTINUED | OUTPATIENT
Start: 2023-05-25 | End: 2023-05-27 | Stop reason: HOSPADM

## 2023-05-25 RX ORDER — PROCHLORPERAZINE 25 MG
12.5 SUPPOSITORY, RECTAL RECTAL EVERY 12 HOURS PRN
Status: DISCONTINUED | OUTPATIENT
Start: 2023-05-25 | End: 2023-05-27 | Stop reason: HOSPADM

## 2023-05-25 RX ORDER — PROCHLORPERAZINE MALEATE 5 MG
5 TABLET ORAL EVERY 6 HOURS PRN
Status: DISCONTINUED | OUTPATIENT
Start: 2023-05-25 | End: 2023-05-27 | Stop reason: HOSPADM

## 2023-05-25 RX ORDER — ACETAMINOPHEN 325 MG/1
975 TABLET ORAL EVERY 8 HOURS
Status: DISCONTINUED | OUTPATIENT
Start: 2023-05-25 | End: 2023-05-27

## 2023-05-25 RX ORDER — OXYCODONE HYDROCHLORIDE 10 MG/1
10 TABLET ORAL
Status: CANCELLED | OUTPATIENT
Start: 2023-05-25

## 2023-05-25 RX ORDER — EPHEDRINE SULFATE 50 MG/ML
INJECTION, SOLUTION INTRAMUSCULAR; INTRAVENOUS; SUBCUTANEOUS PRN
Status: DISCONTINUED | OUTPATIENT
Start: 2023-05-25 | End: 2023-05-25

## 2023-05-25 RX ORDER — SODIUM CHLORIDE 9 MG/ML
INJECTION, SOLUTION INTRAVENOUS CONTINUOUS
Status: ACTIVE | OUTPATIENT
Start: 2023-05-25 | End: 2023-05-26

## 2023-05-25 RX ORDER — ONDANSETRON 4 MG/1
4 TABLET, ORALLY DISINTEGRATING ORAL EVERY 30 MIN PRN
Status: CANCELLED | OUTPATIENT
Start: 2023-05-25

## 2023-05-25 RX ORDER — CEFTRIAXONE 2 G/1
2 INJECTION, POWDER, FOR SOLUTION INTRAMUSCULAR; INTRAVENOUS ONCE
Status: COMPLETED | OUTPATIENT
Start: 2023-05-25 | End: 2023-05-25

## 2023-05-25 RX ORDER — PROPOFOL 10 MG/ML
INJECTION, EMULSION INTRAVENOUS PRN
Status: DISCONTINUED | OUTPATIENT
Start: 2023-05-25 | End: 2023-05-25

## 2023-05-25 RX ORDER — FLUTICASONE PROPIONATE 50 MCG
2 SPRAY, SUSPENSION (ML) NASAL 2 TIMES DAILY PRN
Status: DISCONTINUED | OUTPATIENT
Start: 2023-05-25 | End: 2023-05-27 | Stop reason: HOSPADM

## 2023-05-25 RX ORDER — ONDANSETRON 4 MG/1
4 TABLET, ORALLY DISINTEGRATING ORAL EVERY 30 MIN PRN
Status: DISCONTINUED | OUTPATIENT
Start: 2023-05-25 | End: 2023-05-25 | Stop reason: HOSPADM

## 2023-05-25 RX ORDER — ONDANSETRON 2 MG/ML
4 INJECTION INTRAMUSCULAR; INTRAVENOUS EVERY 6 HOURS PRN
Status: DISCONTINUED | OUTPATIENT
Start: 2023-05-25 | End: 2023-05-25

## 2023-05-25 RX ORDER — OXYCODONE HYDROCHLORIDE 10 MG/1
10 TABLET ORAL EVERY 4 HOURS PRN
Status: DISCONTINUED | OUTPATIENT
Start: 2023-05-25 | End: 2023-05-27 | Stop reason: HOSPADM

## 2023-05-25 RX ORDER — SODIUM CHLORIDE, SODIUM LACTATE, POTASSIUM CHLORIDE, CALCIUM CHLORIDE 600; 310; 30; 20 MG/100ML; MG/100ML; MG/100ML; MG/100ML
INJECTION, SOLUTION INTRAVENOUS CONTINUOUS PRN
Status: DISCONTINUED | OUTPATIENT
Start: 2023-05-25 | End: 2023-05-25

## 2023-05-25 RX ORDER — PROCHLORPERAZINE MALEATE 5 MG
5 TABLET ORAL EVERY 6 HOURS PRN
Status: DISCONTINUED | OUTPATIENT
Start: 2023-05-25 | End: 2023-05-25

## 2023-05-25 RX ORDER — DEXTROSE MONOHYDRATE 25 G/50ML
25-50 INJECTION, SOLUTION INTRAVENOUS
Status: DISCONTINUED | OUTPATIENT
Start: 2023-05-25 | End: 2023-05-27 | Stop reason: HOSPADM

## 2023-05-25 RX ORDER — GLYCOPYRROLATE 0.2 MG/ML
INJECTION, SOLUTION INTRAMUSCULAR; INTRAVENOUS PRN
Status: DISCONTINUED | OUTPATIENT
Start: 2023-05-25 | End: 2023-05-25

## 2023-05-25 RX ORDER — ONDANSETRON 4 MG/1
4 TABLET, ORALLY DISINTEGRATING ORAL EVERY 6 HOURS PRN
Status: DISCONTINUED | OUTPATIENT
Start: 2023-05-25 | End: 2023-05-25

## 2023-05-25 RX ORDER — EPINEPHRINE 1 MG/ML
INJECTION INTRAMUSCULAR; INTRAVENOUS; SUBCUTANEOUS PRN
Status: DISCONTINUED | OUTPATIENT
Start: 2023-05-25 | End: 2023-05-25 | Stop reason: HOSPADM

## 2023-05-25 RX ORDER — ONDANSETRON 2 MG/ML
4 INJECTION INTRAMUSCULAR; INTRAVENOUS EVERY 30 MIN PRN
Status: DISCONTINUED | OUTPATIENT
Start: 2023-05-25 | End: 2023-05-25 | Stop reason: HOSPADM

## 2023-05-25 RX ORDER — CEPHALEXIN 500 MG/1
500 CAPSULE ORAL EVERY 8 HOURS SCHEDULED
Status: DISCONTINUED | OUTPATIENT
Start: 2023-05-26 | End: 2023-05-26

## 2023-05-25 RX ORDER — OXYCODONE HYDROCHLORIDE 5 MG/1
5 TABLET ORAL EVERY 4 HOURS PRN
Status: DISCONTINUED | OUTPATIENT
Start: 2023-05-25 | End: 2023-05-27 | Stop reason: HOSPADM

## 2023-05-25 RX ORDER — ALLOPURINOL 100 MG/1
200 TABLET ORAL DAILY
Status: DISCONTINUED | OUTPATIENT
Start: 2023-05-26 | End: 2023-05-27 | Stop reason: HOSPADM

## 2023-05-25 RX ORDER — ONDANSETRON 2 MG/ML
4 INJECTION INTRAMUSCULAR; INTRAVENOUS EVERY 30 MIN PRN
Status: CANCELLED | OUTPATIENT
Start: 2023-05-25

## 2023-05-25 RX ORDER — HYDRALAZINE HYDROCHLORIDE 20 MG/ML
10-20 INJECTION INTRAMUSCULAR; INTRAVENOUS EVERY 30 MIN PRN
Status: DISCONTINUED | OUTPATIENT
Start: 2023-05-25 | End: 2023-05-27 | Stop reason: HOSPADM

## 2023-05-25 RX ORDER — ONDANSETRON 2 MG/ML
INJECTION INTRAMUSCULAR; INTRAVENOUS PRN
Status: DISCONTINUED | OUTPATIENT
Start: 2023-05-25 | End: 2023-05-25

## 2023-05-25 RX ORDER — HYDROMORPHONE HYDROCHLORIDE 1 MG/ML
0.2 INJECTION, SOLUTION INTRAMUSCULAR; INTRAVENOUS; SUBCUTANEOUS EVERY 5 MIN PRN
Status: DISCONTINUED | OUTPATIENT
Start: 2023-05-25 | End: 2023-05-25 | Stop reason: HOSPADM

## 2023-05-25 RX ORDER — AMLODIPINE BESYLATE 10 MG/1
10 TABLET ORAL EVERY MORNING
Status: DISCONTINUED | OUTPATIENT
Start: 2023-05-26 | End: 2023-05-27 | Stop reason: HOSPADM

## 2023-05-25 RX ORDER — FENTANYL CITRATE 50 UG/ML
50 INJECTION, SOLUTION INTRAMUSCULAR; INTRAVENOUS EVERY 5 MIN PRN
Status: DISCONTINUED | OUTPATIENT
Start: 2023-05-25 | End: 2023-05-25 | Stop reason: HOSPADM

## 2023-05-25 RX ORDER — BISACODYL 10 MG
10 SUPPOSITORY, RECTAL RECTAL DAILY PRN
Status: DISCONTINUED | OUTPATIENT
Start: 2023-05-25 | End: 2023-05-27 | Stop reason: HOSPADM

## 2023-05-25 RX ORDER — LIDOCAINE HYDROCHLORIDE 20 MG/ML
INJECTION, SOLUTION INFILTRATION; PERINEURAL PRN
Status: DISCONTINUED | OUTPATIENT
Start: 2023-05-25 | End: 2023-05-25

## 2023-05-25 RX ADMIN — SENNOSIDES AND DOCUSATE SODIUM 1 TABLET: 50; 8.6 TABLET ORAL at 20:40

## 2023-05-25 RX ADMIN — PROPOFOL 20 MG: 10 INJECTION, EMULSION INTRAVENOUS at 08:55

## 2023-05-25 RX ADMIN — ONDANSETRON 4 MG: 2 INJECTION INTRAMUSCULAR; INTRAVENOUS at 12:45

## 2023-05-25 RX ADMIN — DEXMEDETOMIDINE HYDROCHLORIDE 4 MCG: 100 INJECTION, SOLUTION INTRAVENOUS at 10:13

## 2023-05-25 RX ADMIN — FENTANYL CITRATE 25 MCG: 50 INJECTION, SOLUTION INTRAMUSCULAR; INTRAVENOUS at 15:04

## 2023-05-25 RX ADMIN — PHENYLEPHRINE HYDROCHLORIDE 0.2 MCG/KG/MIN: 10 INJECTION INTRAVENOUS at 11:25

## 2023-05-25 RX ADMIN — Medication 50 MG: at 07:44

## 2023-05-25 RX ADMIN — SUCCINYLCHOLINE CHLORIDE 140 MG: 20 INJECTION, SOLUTION INTRAMUSCULAR; INTRAVENOUS; PARENTERAL at 07:39

## 2023-05-25 RX ADMIN — DEXMEDETOMIDINE HYDROCHLORIDE 8 MCG: 100 INJECTION, SOLUTION INTRAVENOUS at 09:20

## 2023-05-25 RX ADMIN — GLYCOPYRROLATE 0.1 MG: 0.2 INJECTION, SOLUTION INTRAMUSCULAR; INTRAVENOUS at 09:44

## 2023-05-25 RX ADMIN — TAMSULOSIN HYDROCHLORIDE 0.4 MG: 0.4 CAPSULE ORAL at 21:04

## 2023-05-25 RX ADMIN — FENTANYL CITRATE 50 MCG: 50 INJECTION, SOLUTION INTRAMUSCULAR; INTRAVENOUS at 09:05

## 2023-05-25 RX ADMIN — PHENYLEPHRINE HYDROCHLORIDE 100 MCG: 10 INJECTION INTRAVENOUS at 11:17

## 2023-05-25 RX ADMIN — HYDROMORPHONE HYDROCHLORIDE 0.25 MG: 1 INJECTION, SOLUTION INTRAMUSCULAR; INTRAVENOUS; SUBCUTANEOUS at 09:17

## 2023-05-25 RX ADMIN — IOPAMIDOL 75 ML: 755 INJECTION, SOLUTION INTRAVENOUS at 06:51

## 2023-05-25 RX ADMIN — EPHEDRINE SULFATE 5 MG: 5 INJECTION INTRAVENOUS at 08:25

## 2023-05-25 RX ADMIN — FENTANYL CITRATE 25 MCG: 50 INJECTION, SOLUTION INTRAMUSCULAR; INTRAVENOUS at 13:45

## 2023-05-25 RX ADMIN — Medication 30 MG: at 10:54

## 2023-05-25 RX ADMIN — LABETALOL HYDROCHLORIDE 10 MG: 5 INJECTION, SOLUTION INTRAVENOUS at 23:39

## 2023-05-25 RX ADMIN — SODIUM CHLORIDE, POTASSIUM CHLORIDE, SODIUM LACTATE AND CALCIUM CHLORIDE: 600; 310; 30; 20 INJECTION, SOLUTION INTRAVENOUS at 09:50

## 2023-05-25 RX ADMIN — Medication 3 G: at 12:12

## 2023-05-25 RX ADMIN — SUGAMMADEX 300 MG: 100 INJECTION, SOLUTION INTRAVENOUS at 12:58

## 2023-05-25 RX ADMIN — PHENYLEPHRINE HYDROCHLORIDE 100 MCG: 10 INJECTION INTRAVENOUS at 10:52

## 2023-05-25 RX ADMIN — Medication 30 MG: at 08:06

## 2023-05-25 RX ADMIN — ACETAMINOPHEN 975 MG: 325 TABLET ORAL at 20:40

## 2023-05-25 RX ADMIN — HYDROMORPHONE HYDROCHLORIDE 0.25 MG: 1 INJECTION, SOLUTION INTRAMUSCULAR; INTRAVENOUS; SUBCUTANEOUS at 12:25

## 2023-05-25 RX ADMIN — EPHEDRINE SULFATE 2.5 MG: 5 INJECTION INTRAVENOUS at 09:49

## 2023-05-25 RX ADMIN — FENTANYL CITRATE 50 MCG: 50 INJECTION, SOLUTION INTRAMUSCULAR; INTRAVENOUS at 08:40

## 2023-05-25 RX ADMIN — OXYMETAZOLINE HYDROCHLORIDE 2 SPRAY: 0.05 SPRAY NASAL at 16:41

## 2023-05-25 RX ADMIN — CEFTRIAXONE SODIUM 2 G: 2 INJECTION, POWDER, FOR SOLUTION INTRAMUSCULAR; INTRAVENOUS at 20:40

## 2023-05-25 RX ADMIN — PHENYLEPHRINE HYDROCHLORIDE 100 MCG: 10 INJECTION INTRAVENOUS at 11:04

## 2023-05-25 RX ADMIN — PROPOFOL 200 MG: 10 INJECTION, EMULSION INTRAVENOUS at 07:39

## 2023-05-25 RX ADMIN — EPHEDRINE SULFATE 2.5 MG: 5 INJECTION INTRAVENOUS at 12:40

## 2023-05-25 RX ADMIN — ONDANSETRON 4 MG: 2 INJECTION INTRAMUSCULAR; INTRAVENOUS at 20:44

## 2023-05-25 RX ADMIN — FENTANYL CITRATE 25 MCG: 50 INJECTION, SOLUTION INTRAMUSCULAR; INTRAVENOUS at 15:11

## 2023-05-25 RX ADMIN — OXYMETAZOLINE HYDROCHLORIDE 2 SPRAY: 0.05 SPRAY NASAL at 21:32

## 2023-05-25 RX ADMIN — Medication 20 MG: at 08:49

## 2023-05-25 RX ADMIN — HYDROMORPHONE HYDROCHLORIDE 0.5 MG: 1 INJECTION, SOLUTION INTRAMUSCULAR; INTRAVENOUS; SUBCUTANEOUS at 08:58

## 2023-05-25 RX ADMIN — LIDOCAINE HYDROCHLORIDE 100 MG: 20 INJECTION, SOLUTION INFILTRATION; PERINEURAL at 07:39

## 2023-05-25 RX ADMIN — SODIUM CHLORIDE, SODIUM LACTATE, POTASSIUM CHLORIDE, CALCIUM CHLORIDE: 600; 310; 30; 20 INJECTION, SOLUTION INTRAVENOUS at 11:25

## 2023-05-25 RX ADMIN — GLYCOPYRROLATE 0.2 MG: 0.2 INJECTION, SOLUTION INTRAMUSCULAR; INTRAVENOUS at 09:47

## 2023-05-25 RX ADMIN — FENTANYL CITRATE 50 MCG: 50 INJECTION, SOLUTION INTRAMUSCULAR; INTRAVENOUS at 08:47

## 2023-05-25 RX ADMIN — EPHEDRINE SULFATE 5 MG: 5 INJECTION INTRAVENOUS at 11:25

## 2023-05-25 RX ADMIN — Medication 20 MG: at 09:44

## 2023-05-25 RX ADMIN — PHENYLEPHRINE HYDROCHLORIDE 50 MCG: 10 INJECTION INTRAVENOUS at 10:34

## 2023-05-25 RX ADMIN — EPHEDRINE SULFATE 5 MG: 5 INJECTION INTRAVENOUS at 08:28

## 2023-05-25 RX ADMIN — Medication 20 MG: at 11:42

## 2023-05-25 RX ADMIN — FENTANYL CITRATE 25 MCG: 50 INJECTION, SOLUTION INTRAMUSCULAR; INTRAVENOUS at 13:39

## 2023-05-25 RX ADMIN — OXYCODONE HYDROCHLORIDE 10 MG: 10 TABLET ORAL at 16:40

## 2023-05-25 RX ADMIN — EPHEDRINE SULFATE 5 MG: 5 INJECTION INTRAVENOUS at 09:01

## 2023-05-25 RX ADMIN — Medication 3 G: at 08:12

## 2023-05-25 RX ADMIN — OXYCODONE HYDROCHLORIDE 10 MG: 10 TABLET ORAL at 20:24

## 2023-05-25 RX ADMIN — SODIUM CHLORIDE: 9 INJECTION, SOLUTION INTRAVENOUS at 20:37

## 2023-05-25 RX ADMIN — SODIUM CHLORIDE, POTASSIUM CHLORIDE, SODIUM LACTATE AND CALCIUM CHLORIDE: 600; 310; 30; 20 INJECTION, SOLUTION INTRAVENOUS at 07:35

## 2023-05-25 RX ADMIN — PHENYLEPHRINE HYDROCHLORIDE 50 MCG: 10 INJECTION INTRAVENOUS at 10:40

## 2023-05-25 ASSESSMENT — ENCOUNTER SYMPTOMS: SEIZURES: 0

## 2023-05-25 ASSESSMENT — ACTIVITIES OF DAILY LIVING (ADL)
ADLS_ACUITY_SCORE: 21
ADLS_ACUITY_SCORE: 18
ADLS_ACUITY_SCORE: 16
ADLS_ACUITY_SCORE: 18
ADLS_ACUITY_SCORE: 21
ADLS_ACUITY_SCORE: 18

## 2023-05-25 ASSESSMENT — LIFESTYLE VARIABLES: TOBACCO_USE: 1

## 2023-05-25 NOTE — ANESTHESIA POSTPROCEDURE EVALUATION
Patient: Bill Daugherty    Procedure: Procedure(s):  stealth assisted Endoscopic endonasal transsellar approach for tumor, Bilateral endoscopic sinus surgery       Anesthesia Type:  General    Note:  Disposition: Inpatient   Postop Pain Control: Uneventful            Sign Out: Well controlled pain   PONV: No   Neuro/Psych: Uneventful            Sign Out: Acceptable/Baseline neuro status   Airway/Respiratory: Uneventful            Sign Out: Acceptable/Baseline resp. status   CV/Hemodynamics: Uneventful            Sign Out: Acceptable CV status; No obvious hypovolemia; No obvious fluid overload   Other NRE: NONE   DID A NON-ROUTINE EVENT OCCUR? No           Last vitals:  Vitals Value Taken Time   /80 05/25/23 1350   Temp 36.5  C (97.7  F) 05/25/23 1308   Pulse 84 05/25/23 1352   Resp 12 05/25/23 1352   SpO2 95 % 05/25/23 1352   Vitals shown include unvalidated device data.    Electronically Signed By: Skyler Avila DO  May 25, 2023  1:53 PM

## 2023-05-25 NOTE — ANESTHESIA PROCEDURE NOTES
Airway       Patient location during procedure: OR       Procedure Start/Stop Times: 5/25/2023 7:42 AM  Staff -        Anesthesiologist:  Skyler Avila DO       CRNA: Sarah Dorman APRN CRNA       Other Anesthesia Staff: Kajal Mckeon       Performed By: SRNA  Consent for Airway        Urgency: elective  Indications and Patient Condition       Indications for airway management: gumaro-procedural       Induction type:intravenous       Mask difficulty assessment: 2 - vent by mask + OA or adjuvant +/- NMBA    Final Airway Details       Final airway type: endotracheal airway       Successful airway: ETT - single and Oral  Endotracheal Airway Details        ETT size (mm): 8.0       Cuffed: yes       Successful intubation technique: video laryngoscopy       Grade View of Cords: 1       Adjucts: stylet       Position: Left       Measured from: gums/teeth       Secured at (cm): 23       Bite block used: None    Post intubation assessment        Placement verified by: capnometry and chest rise        Number of attempts at approach: 1       Number of other approaches attempted: 0       Secured with: pink tape       Ease of procedure: easy       Dentition: Intact and Unchanged    Medication(s) Administered   Medication Administration Time: 5/25/2023 7:42 AM

## 2023-05-25 NOTE — ANESTHESIA PROCEDURE NOTES
Arterial Line Procedure Note    Pre-Procedure   Staff -        Anesthesiologist:  Skyler Avila DO       Other Anesthesia Staff: Kajal Mckeon       Performed By: JOE       Location: OR       Pre-Anesthestic Checklist: patient identified, IV checked, risks and benefits discussed, informed consent, monitors and equipment checked, pre-op evaluation and at physician/surgeon's request  Timeout:       Correct Patient: Yes        Correct Procedure: Yes        Correct Site: Yes        Correct Position: Yes   Line Placement:   This line was placed Post Induction  Procedure   Procedure: arterial line       Laterality: right       Insertion Site: radial.  Sterile Prep        Standard elements of sterile barrier followed       Skin prep: prepped with acceptable alternative  Insertion/Injection        Technique: Seldinger Technique and Yevgeniy's test completed        6. There were no apparent abnormal pathologic findings.       Catheter Type/Size: 20 G, 1.75 in/4.5 cm quick cath (integral wire)  Narrative         Secured by: other       Tegaderm dressing used.       Complications: None apparent,        Arterial waveform: Yes        IBP within 10% of NIBP: Yes

## 2023-05-25 NOTE — OP NOTE
Procedure Date: 05/25/2023     PREOPERATIVE DIAGNOSIS:    Pituitary adenoma, non-secreting  Chronic sinusitis with several prior surgeries     POSTOPERATIVE DIAGNOSIS:    Pituitary adenoma, non-secreting  Chronic sinusitis with several prior surgeries     PROCEDURES PERFORMED:  1.  Endoscopic endonasal transsellar approach for pituitary adenoma resection  2.  Use of intraoperative Stealth navigation  3.  Neuromonitoring for SSEP, EEG     ATTENDING SURGEON:  Richard Lim MD     CO-SURGEON: Shadi Phillips MD     RESIDENT SURGEONS:  Yaneli Resendez MD     ANESTHESIA:  General     ESTIMATED BLOOD LOSS:  10 mL.     INTRAOPERATIVE FINDINGS:  Pituitary tumor was resected along the capsule margin. No residual tumor was visible at the end of the case. No CSF leak.     INDICATIONS FOR PROCEDURE: Bill Daugherty is a 72 year old male with a history of HLD, gout, DALLAS (CPAP use since the 1980's), osteoarthritis, hypertension, and DMII who has been followed up in the neurosurgery clinic for growing pituitary adenoma. His tumor grew over the past 5 months and we discussed options of observation, radiation and surgical resection. He had a strong preference for surgical resection. After a discussion of the risks, patient agreed to proceed with surgery and signed consent.     DESCRIPTION OF PROCEDURE:  The patient was marked and consented in the preoperative area. He was brought to the operating room. General anesthesia was induced and the patient was intubated.  An arterial line and solis were placed. The head of bed was rotated 180 degrees and neuromonitoring leads were placed for SSEPs and EEG.  The stealth neuronavigation software with pre-op images were used to registered the patient's face.  Good registration was obtained with Axiem.  We then planned for possible abdominal fat graft site on the left.  The patient was then prepped and draped in normal sterile fashion. A timeout was performed to confirm details of the  procedure.    At this time, our ENT colleague, Dr. Phillips, took over the case and provided the approach.  Please see Dr. Phillips's note for further details.     With an excellent view of the posterior sphenoid, neurosurgery was called and we returned to the case. Using Stealth navigation and ultrasound doppler, we identified the locations of the carotid arteries, as well as the location of the tumor. The sellar bone had already been thinned out by the tumor, thus we used kerrison rongeurs to eggshell and remove the bone over the sella.  The left cavernous carotid artery at the genu was exposed given the lateral extent of the tumor. Next the dura was opened.  We could identify a friable tumor tissue which was then sampled for permanent pathology. The tumor was then debulked centrally with a combination of suction and pituitary rongeurs. We then worked laterally to remove tumor along the medial cavernous sinus walls bilaterally. We worked around the tumor, removing tissue from the lateral aspect of the tumor and dorsum sellae. The tumor was soft. The pituitary gland was eccentric to the right side. We then inspected and removed any residual tumor. Bleeding from the cavernous sinus wall was controlled with floseal and patties. We then irrigated the field and reinspected to find no residual tumor.  No CSF leak was seen. The pituitary glanda appeared healthy. A layer of duragen was applied on the sellar exposure and reinforced with surgicel.     At this point, our ENT colleague, Dr. Phillips, performed a closure utilizing a nasoseptal flap. Please refer to his dictation for details of his procedure.  Once the closure had been completed, the drapes were taken down.  General anesthesia was gently reversed and the patient was extubated.  The patient was then transferred back to the hospital bed and taken to the postoperative care area for his postoperative cares.       All counts were correct at the end of the procedure x 2.        Dr. Richard Lim was present and scrubbed throughout all critical portions of the case and otherwise immediately available.     -----------------------------------  Yaneli Resendez MD, MS  Neurosurgery PGY-6      I was present and scrubbed through the critical portions of the neurosurgical portion of the procedure, and I was immediately available throughout. I called the spouse and spoke with his daughter in the waiting room upon conclusion of the procedure.    Richard Lim MD

## 2023-05-25 NOTE — ANESTHESIA PREPROCEDURE EVALUATION
Anesthesia Pre-Procedure Evaluation    Patient: Bill Daugherty   MRN: [id number] : 1950        Procedure : Procedure(s):  stealth assisted Endoscopic endonasal transsellar approach for tumor  possible INSERTION, DRAIN, SPINE, LUMBAR  possible fascia isael graft, possible abdominal fat graft          Past Medical History:   Diagnosis Date     Bilateral low back pain with left-sided sciatica      BPH (benign prostatic hyperplasia)      Diabetes mellitus, type 2 (H)      Gout      History of pancreatitis      HLP (hyperkeratosis lenticularis perstans)      HTN (hypertension)      Nephrolithiasis      Obesity      DALLAS (obstructive sleep apnea)     uses cpap     Pituitary macroadenoma (H)      Pulmonary nodules      PVD (posterior vitreous detachment), unspecified laterality      Shoulder pain      Steatosis of liver       Past Surgical History:   Procedure Laterality Date     ARTHROPLASTY KNEE Right 2023    Procedure: RIGHT TOTAL KNEE ARTHROPLASTY;  Surgeon: Anseth, Scott Duane, MD;  Location: [facility]     CHOLECYSTECTOMY       ENDOSCOPIC RETROGRADE CHOLANGIOPANCREATOGRAPHY       septoplasty and turbinate reduction Bilateral      TOTAL KNEE ARTHROPLASTY Left       Allergies   Allergen Reactions     Atorvastatin      Metformin      Other reaction(s): Gastrointestinal     Pravastatin      Other reaction(s): Pancreatitis     Chlorhexidine Gluconate Rash      Social History     Tobacco Use     Smoking status: Former     Types: Cigarettes     Quit date:      Years since quittin.4     Smokeless tobacco: Never   Vaping Use     Vaping status: Never Used     Passive vaping exposure: Yes   Substance Use Topics     Alcohol use: Yes     Comment: rarely      Wt Readings from Last 1 Encounters:   23 133.4 kg (294 lb 1.5 oz)        Anesthesia Evaluation   Pt has had prior anesthetic.     History of anesthetic complications   Urinary retention requiring catheter.    ROS/MED HX  ENT/Pulmonary:     (+) sleep apnea,  uses CPAP, tobacco use, Past use,  (-) asthma and recent URI   Neurologic: Comment: Pituitary adenoma   (-) no seizures and no CVA   Cardiovascular:     (+) hypertension-----    METS/Exercise Tolerance: 4 - Raking leaves, gardening Comment: Walks 2 miles regularly.     Hematologic:  - neg hematologic  ROS  (-) history of blood clots and history of blood transfusion   Musculoskeletal: Comment: Sciatica    S/p B/L knee arthropalsties (most recently Feb 2023)    Gout        GI/Hepatic: Comment: Hx pancreatitis x2 due to bile duct stone      (+) liver disease (Fatty liver),  (-) GERD   Renal/Genitourinary:     (+) Nephrolithiasis , BPH,  (-) renal disease   Endo:     (+) type II DM, Last HgA1c: 7.0, date: 2/1/23, Not using insulin, Obesity,     Psychiatric/Substance Use:  - neg psychiatric ROS     Infectious Disease:  - neg infectious disease ROS     Malignancy:  - neg malignancy ROS     Other:  - neg other ROS          Physical Exam    Airway        Mallampati: III   TM distance: > 3 FB   Neck ROM: full   Mouth opening: > 3 cm    Respiratory Devices and Support         Dental       (+) Completely normal teeth      Cardiovascular          Rhythm and rate: regular and normal     Pulmonary           breath sounds clear to auscultation           OUTSIDE LABS:  CBC:   Lab Results   Component Value Date    WBC 9.4 05/03/2023    HGB 14.1 05/03/2023    HCT 43.2 05/03/2023     05/03/2023     02/16/2023     BMP:   Lab Results   Component Value Date     05/03/2023     11/30/2022    POTASSIUM 4.6 05/03/2023    POTASSIUM 4.1 02/16/2023    CHLORIDE 103 05/03/2023    CHLORIDE 102 11/30/2022    CO2 28 05/03/2023    CO2 30 (H) 11/30/2022    BUN 21.2 05/03/2023    BUN 20.7 11/30/2022    CR 1.24 (H) 05/03/2023    CR 1.17 11/30/2022     (H) 05/25/2023     (H) 05/03/2023     COAGS: No results found for: PTT, INR, FIBR  POC: No results found for: BGM, HCG, HCGS  HEPATIC: No results found for: ALBUMIN,  PROTTOTAL, ALT, AST, GGT, ALKPHOS, BILITOTAL, BILIDIRECT, BRYCE  OTHER:   Lab Results   Component Value Date    NAVEEN 10.3 (H) 05/03/2023    TSH 1.76 11/30/2022    T4 0.93 11/30/2022       Anesthesia Plan    ASA Status:  3   NPO Status:  NPO Appropriate    Anesthesia Type: General.     - Airway: ETT   Induction: Intravenous.   Maintenance: Balanced.   Techniques and Equipment:     - Airway: Video-Laryngoscope     - Lines/Monitors: 2nd IV, Arterial Line     Consents    Anesthesia Plan(s) and associated risks, benefits, and realistic alternatives discussed. Questions answered and patient/representative(s) expressed understanding.     - Discussed: Risks, Benefits and Alternatives for BOTH SEDATION and the PROCEDURE were discussed     - Discussed with:  Patient      - Extended Intubation/Ventilatory Support Discussed: No.      - Patient is DNR/DNI Status: No    Use of blood products discussed: Yes.     - Discussed with: Patient.     - Consented: consented to blood products            Reason for refusal: other.     Postoperative Care    Pain management: IV analgesics.   PONV prophylaxis: Ondansetron (or other 5HT-3), Dexamethasone or Solumedrol     Comments:                Skyler Avila DO

## 2023-05-25 NOTE — BRIEF OP NOTE
"[facility]    Brief Operative Note    Pre-operative diagnosis: Pituitary adenoma with extrasellar extension (H) [D35.2]  Post-operative diagnosis Same as pre-operative diagnosis    Procedure: Procedure(s):  stealth assisted Endoscopic endonasal transsellar approach for tumor  possible INSERTION, DRAIN, SPINE, LUMBAR  possible fascia isael graft, possible abdominal fat graft  Surgeon: Surgeon(s) and Role:     * Richard Lim MD - Primary     * Shadi Phillips MD - Assisting     * Yaneli Resendez MD - Resident - Assisting  Anesthesia: General   Estimated Blood Loss: 10ml    Drains: None  Specimens:   ID Type Source Tests Collected by Time Destination   1 : pituitary tumor Tissue Other SURGICAL PATHOLOGY EXAM Richard Lim MD 5/25/2023 12:13 PM      Findings: Pituitary tumor resected. No CSF leak noted.  Complications: None.  Implants:   Implant Name Type Inv. Item Serial No.  Lot No. LRB No. Used Action   GRAFT DURAGEN 2X2\"  - [id number]  GRAFT DURAGEN 2X2\"   [employer] [id number]  1 Implanted           "

## 2023-05-25 NOTE — ANESTHESIA CARE TRANSFER NOTE
Patient: Bill Daugherty    Procedure: Procedure(s):  stealth assisted Endoscopic endonasal transsellar approach for tumor, Bilateral endoscopic sinus surgery       Diagnosis: Pituitary adenoma with extrasellar extension (H) [D35.2]  Diagnosis Additional Information: No value filed.    Anesthesia Type:   General     Note:    Oropharynx: oropharynx clear of all foreign objects and spontaneously breathing  Level of Consciousness: drowsy  Oxygen Supplementation: face mask  Level of Supplemental Oxygen (L/min / FiO2): 4  Independent Airway: airway patency satisfactory and stable  Dentition: dentition unchanged  Vital Signs Stable: post-procedure vital signs reviewed and stable  Report to RN Given: handoff report given  Patient transferred to: PACU    Handoff Report: Identifed the Patient, Identified the Reponsible Provider, Reviewed the pertinent medical history, Discussed the surgical course, Reviewed Intra-OP anesthesia mangement and issues during anesthesia, Set expectations for post-procedure period and Allowed opportunity for questions and acknowledgement of understanding      Vitals:  Vitals Value Taken Time   BP     Temp     Pulse 81 05/25/23 1310   Resp 15 05/25/23 1310   SpO2 97 % 05/25/23 1310   Vitals shown include unvalidated device data.    Electronically Signed By: ANDRÉS Barraza CRNA  May 25, 2023  1:11 PM

## 2023-05-25 NOTE — PROGRESS NOTES
I spoke with Bill on the telephone this morning. I answered all additional questions he had related to ENT aspects of surgery.  He was appreciative of the call.     Shadi Phillips MD    [facility]  Department of Otolaryngology - Head & Neck Surgery

## 2023-05-26 ENCOUNTER — APPOINTMENT (OUTPATIENT)
Dept: OCCUPATIONAL THERAPY | Facility: CLINIC | Age: 73
DRG: 615 | End: 2023-05-26
Attending: STUDENT IN AN ORGANIZED HEALTH CARE EDUCATION/TRAINING PROGRAM
Payer: COMMERCIAL

## 2023-05-26 LAB
ANION GAP SERPL CALCULATED.3IONS-SCNC: 14 MMOL/L (ref 7–15)
BUN SERPL-MCNC: 15.3 MG/DL (ref 8–23)
CALCIUM SERPL-MCNC: 8.6 MG/DL (ref 8.8–10.2)
CHLORIDE SERPL-SCNC: 101 MMOL/L (ref 98–107)
CORTIS SERPL-MCNC: 17.4 UG/DL
CREAT SERPL-MCNC: 1.07 MG/DL (ref 0.67–1.17)
DEPRECATED HCO3 PLAS-SCNC: 23 MMOL/L (ref 22–29)
ERYTHROCYTE [DISTWIDTH] IN BLOOD BY AUTOMATED COUNT: 16.4 % (ref 10–15)
GFR SERPL CREATININE-BSD FRML MDRD: 74 ML/MIN/1.73M2
GLUCOSE BLDC GLUCOMTR-MCNC: 141 MG/DL (ref 70–99)
GLUCOSE BLDC GLUCOMTR-MCNC: 152 MG/DL (ref 70–99)
GLUCOSE BLDC GLUCOMTR-MCNC: 160 MG/DL (ref 70–99)
GLUCOSE BLDC GLUCOMTR-MCNC: 167 MG/DL (ref 70–99)
GLUCOSE BLDC GLUCOMTR-MCNC: 173 MG/DL (ref 70–99)
GLUCOSE BLDC GLUCOMTR-MCNC: 174 MG/DL (ref 70–99)
GLUCOSE BLDC GLUCOMTR-MCNC: 178 MG/DL (ref 70–99)
GLUCOSE SERPL-MCNC: 155 MG/DL (ref 70–99)
HCT VFR BLD AUTO: 36.2 % (ref 40–53)
HGB BLD-MCNC: 11.6 G/DL (ref 13.3–17.7)
MAGNESIUM SERPL-MCNC: 2 MG/DL (ref 1.7–2.3)
MCH RBC QN AUTO: 27.6 PG (ref 26.5–33)
MCHC RBC AUTO-ENTMCNC: 32 G/DL (ref 31.5–36.5)
MCV RBC AUTO: 86 FL (ref 78–100)
PHOSPHATE SERPL-MCNC: 2.4 MG/DL (ref 2.5–4.5)
PLATELET # BLD AUTO: 249 10E3/UL (ref 150–450)
POTASSIUM SERPL-SCNC: 3.8 MMOL/L (ref 3.4–5.3)
RBC # BLD AUTO: 4.2 10E6/UL (ref 4.4–5.9)
SARS-COV-2 RNA RESP QL NAA+PROBE: NEGATIVE
SODIUM SERPL-SCNC: 138 MMOL/L (ref 136–145)
WBC # BLD AUTO: 14.1 10E3/UL (ref 4–11)

## 2023-05-26 PROCEDURE — 120N000002 HC R&B MED SURG/OB UMMC

## 2023-05-26 PROCEDURE — 97165 OT EVAL LOW COMPLEX 30 MIN: CPT | Mod: GO

## 2023-05-26 PROCEDURE — 250N000013 HC RX MED GY IP 250 OP 250 PS 637: Performed by: NEUROLOGICAL SURGERY

## 2023-05-26 PROCEDURE — 36415 COLL VENOUS BLD VENIPUNCTURE: CPT

## 2023-05-26 PROCEDURE — 82310 ASSAY OF CALCIUM: CPT | Performed by: STUDENT IN AN ORGANIZED HEALTH CARE EDUCATION/TRAINING PROGRAM

## 2023-05-26 PROCEDURE — 84100 ASSAY OF PHOSPHORUS: CPT | Performed by: STUDENT IN AN ORGANIZED HEALTH CARE EDUCATION/TRAINING PROGRAM

## 2023-05-26 PROCEDURE — 250N000011 HC RX IP 250 OP 636: Performed by: STUDENT IN AN ORGANIZED HEALTH CARE EDUCATION/TRAINING PROGRAM

## 2023-05-26 PROCEDURE — 82533 TOTAL CORTISOL: CPT | Performed by: NEUROLOGICAL SURGERY

## 2023-05-26 PROCEDURE — 250N000012 HC RX MED GY IP 250 OP 636 PS 637: Performed by: STUDENT IN AN ORGANIZED HEALTH CARE EDUCATION/TRAINING PROGRAM

## 2023-05-26 PROCEDURE — 36415 COLL VENOUS BLD VENIPUNCTURE: CPT | Performed by: STUDENT IN AN ORGANIZED HEALTH CARE EDUCATION/TRAINING PROGRAM

## 2023-05-26 PROCEDURE — 97530 THERAPEUTIC ACTIVITIES: CPT | Mod: GO

## 2023-05-26 PROCEDURE — 250N000013 HC RX MED GY IP 250 OP 250 PS 637

## 2023-05-26 PROCEDURE — 83735 ASSAY OF MAGNESIUM: CPT | Performed by: STUDENT IN AN ORGANIZED HEALTH CARE EDUCATION/TRAINING PROGRAM

## 2023-05-26 PROCEDURE — 250N000013 HC RX MED GY IP 250 OP 250 PS 637: Performed by: STUDENT IN AN ORGANIZED HEALTH CARE EDUCATION/TRAINING PROGRAM

## 2023-05-26 PROCEDURE — 999N000147 HC STATISTIC PT IP EVAL DEFER

## 2023-05-26 PROCEDURE — 85014 HEMATOCRIT: CPT | Performed by: STUDENT IN AN ORGANIZED HEALTH CARE EDUCATION/TRAINING PROGRAM

## 2023-05-26 PROCEDURE — 87635 SARS-COV-2 COVID-19 AMP PRB: CPT | Performed by: NEUROLOGICAL SURGERY

## 2023-05-26 RX ORDER — CEPHALEXIN 500 MG/1
500 CAPSULE ORAL EVERY 6 HOURS SCHEDULED
Status: DISCONTINUED | OUTPATIENT
Start: 2023-05-26 | End: 2023-05-27 | Stop reason: HOSPADM

## 2023-05-26 RX ADMIN — POTASSIUM & SODIUM PHOSPHATES POWDER PACK 280-160-250 MG 1 PACKET: 280-160-250 PACK at 19:44

## 2023-05-26 RX ADMIN — ONDANSETRON 4 MG: 2 INJECTION INTRAMUSCULAR; INTRAVENOUS at 07:08

## 2023-05-26 RX ADMIN — CEPHALEXIN 500 MG: 500 CAPSULE ORAL at 12:42

## 2023-05-26 RX ADMIN — CEPHALEXIN 500 MG: 500 CAPSULE ORAL at 18:11

## 2023-05-26 RX ADMIN — POLYETHYLENE GLYCOL 3350 17 G: 17 POWDER, FOR SOLUTION ORAL at 08:41

## 2023-05-26 RX ADMIN — SENNOSIDES AND DOCUSATE SODIUM 1 TABLET: 50; 8.6 TABLET ORAL at 08:41

## 2023-05-26 RX ADMIN — ACETAMINOPHEN 975 MG: 325 TABLET ORAL at 05:04

## 2023-05-26 RX ADMIN — ACETAMINOPHEN 975 MG: 325 TABLET ORAL at 19:44

## 2023-05-26 RX ADMIN — OXYCODONE HYDROCHLORIDE 5 MG: 5 TABLET ORAL at 18:11

## 2023-05-26 RX ADMIN — SALINE NASAL SPRAY 1 SPRAY: 1.5 SOLUTION NASAL at 18:11

## 2023-05-26 RX ADMIN — SALINE NASAL SPRAY 1 SPRAY: 1.5 SOLUTION NASAL at 20:00

## 2023-05-26 RX ADMIN — ALLOPURINOL 200 MG: 100 TABLET ORAL at 08:42

## 2023-05-26 RX ADMIN — PROCHLORPERAZINE EDISYLATE 5 MG: 5 INJECTION INTRAMUSCULAR; INTRAVENOUS at 19:48

## 2023-05-26 RX ADMIN — INSULIN ASPART 1 UNITS: 100 INJECTION, SOLUTION INTRAVENOUS; SUBCUTANEOUS at 13:30

## 2023-05-26 RX ADMIN — INSULIN ASPART 1 UNITS: 100 INJECTION, SOLUTION INTRAVENOUS; SUBCUTANEOUS at 17:10

## 2023-05-26 RX ADMIN — SALINE NASAL SPRAY 1 SPRAY: 1.5 SOLUTION NASAL at 10:33

## 2023-05-26 RX ADMIN — TAMSULOSIN HYDROCHLORIDE 0.4 MG: 0.4 CAPSULE ORAL at 19:44

## 2023-05-26 RX ADMIN — SALINE NASAL SPRAY 1 SPRAY: 1.5 SOLUTION NASAL at 16:32

## 2023-05-26 RX ADMIN — AMLODIPINE BESYLATE 10 MG: 10 TABLET ORAL at 08:41

## 2023-05-26 RX ADMIN — ONDANSETRON 4 MG: 4 TABLET, ORALLY DISINTEGRATING ORAL at 18:14

## 2023-05-26 RX ADMIN — SALINE NASAL SPRAY 1 SPRAY: 1.5 SOLUTION NASAL at 21:15

## 2023-05-26 RX ADMIN — CEPHALEXIN 500 MG: 500 CAPSULE ORAL at 06:39

## 2023-05-26 RX ADMIN — SALINE NASAL SPRAY 1 SPRAY: 1.5 SOLUTION NASAL at 12:42

## 2023-05-26 RX ADMIN — LOSARTAN POTASSIUM 25 MG: 25 TABLET, FILM COATED ORAL at 08:42

## 2023-05-26 RX ADMIN — SALINE NASAL SPRAY 1 SPRAY: 1.5 SOLUTION NASAL at 06:07

## 2023-05-26 RX ADMIN — SENNOSIDES AND DOCUSATE SODIUM 1 TABLET: 50; 8.6 TABLET ORAL at 19:44

## 2023-05-26 RX ADMIN — SALINE NASAL SPRAY 1 SPRAY: 1.5 SOLUTION NASAL at 08:42

## 2023-05-26 RX ADMIN — POTASSIUM & SODIUM PHOSPHATES POWDER PACK 280-160-250 MG 1 PACKET: 280-160-250 PACK at 16:32

## 2023-05-26 RX ADMIN — INSULIN ASPART 1 UNITS: 100 INJECTION, SOLUTION INTRAVENOUS; SUBCUTANEOUS at 08:43

## 2023-05-26 RX ADMIN — ACETAMINOPHEN 975 MG: 325 TABLET ORAL at 12:42

## 2023-05-26 RX ADMIN — SALINE NASAL SPRAY 1 SPRAY: 1.5 SOLUTION NASAL at 14:04

## 2023-05-26 ASSESSMENT — VISUAL ACUITY
OU: BASELINE
OU: BASELINE

## 2023-05-26 ASSESSMENT — ACTIVITIES OF DAILY LIVING (ADL)
ADLS_ACUITY_SCORE: 21
ADLS_ACUITY_SCORE: 22
ADLS_ACUITY_SCORE: 21
ADLS_ACUITY_SCORE: 22
ADLS_ACUITY_SCORE: 21
ADLS_ACUITY_SCORE: 22
ADLS_ACUITY_SCORE: 22
ADLS_ACUITY_SCORE: 21

## 2023-05-26 NOTE — PLAN OF CARE
Physical Therapy: Orders received. Chart reviewed and discussed with care team.? Physical Therapy not indicated. Per OT, pt near functional baseline transferring and ambulating with SBA with no skilled PT needs at this time.? Defer discharge recommendations to Occupational Therapy.? Will complete orders.

## 2023-05-26 NOTE — PLAN OF CARE
Vitals: VSS on RA  Neuros: Alert and oriented x4. 5/5 throughout. Denied salty/metallic taste.   IV: PIV x3 SL'd  Labs/Electrolytes: Phos replacement ordered per protocol.  Resp/trach: WNL on RA  Diet: Regular diet, fair intake. Denied nausea.  Bowel status: BS+x4, no BM this shift.  : DTV, bladder scan at 1500 was 320, NSG aware, rescan in 2 hours and FYI NSG.  Skin: Splints in place in nostrils - small to moderate bloody drainage. Nasal sling in place.  Pain: Tylenol for HA pain, declined further intervention. Utilizing ice pack.   Activity: Up independently, steady.   Social: Daughter and wife at bedside and supportive this shift.  Plan: Please ensure labs are collected at 0600 on 5/27. Continue with strict I/O.

## 2023-05-26 NOTE — PLAN OF CARE
Status: POD #1 TSS  Vitals: VSS on RA, BP <140   Neuros: AO x 4, denies salty/metallic taste  IV: PIV SL   Labs/Electrolytes: Phosphorus replacement initiated, all other redraws scheduled for the AM  Resp/trach: LS clear, no SOB reported  Diet: Regular, PRN Zofran given x 1  Bowel status: Last BM 5/24, passing gas  : Voided in urinal, PVR 5   Skin: Bilateral splints, UTV, mod bloody drainage.   Pain: Scheduled Tylenol & Oxy given for HA pain   Activity: SBA   Social: Independently updating family  Plan: Continue to monitor and follow POC     Please be sure that labs are collected at 0600 on 5/27.

## 2023-05-26 NOTE — OP NOTE
Procedure Date: 05/25/2023    PREOPERATIVE DIAGNOSES:     1.  Chronic pansinusitis with nasal polyposis.  2.  Pituitary macroadenoma.  3.  History of prior endoscopic sinonasal surgery.    PREOPERATIVE DIAGNOSES:    1.  Chronic pansinusitis with nasal polyposis.  2.  Pituitary macroadenoma.  3.  History of prior endoscopic sinonasal surgery.    PROCEDURE PERFORMED:    1.  Bilateral revision endoscopic total ethmoidectomy, sphenoidotomy (ENT only).  2.  Bilateral revision endoscopic maxillary antrostomy with tissue removal (ENT only).  3.  Endoscopic endonasal transsellar approach for resection of pituitary macroadenoma.  4.  Jewett of right-sided pedicled nasoseptal flap pedicled off the posterior septal branch of the sphenopalatine artery -- modifier 22.    SURGEON:  Shadi Phillips MD    CO-SURGEON:  Richard Lim MD    RESIDENT:  Yaneli Resendez.    INDICATION FOR PROCEDURE:  The patient is a 72-year-old gentleman with a history of chronic sinusitis with nasal polyps and obstructive sleep apnea.  He does have a history of nasal polyps and had surgery in November of last year.  He was found to have a pituitary macroadenoma, which grew on surveillance imaging.  We discussed surgery and he elected to proceed with surgery.    DESCRIPTION OF PROCEDURE IN DETAIL:  The patient was identified in the preoperative holding area where consent was confirmed.  He was subsequently brought back to the operating room where general anesthesia was induced.  He was intubated without issue.  A timeout was performed.  We were all were in agreement.  He was subsequently turned 180 degrees and placed in a horseshoe by neurosurgery service.  Image guidance registration was placed by the neurosurgery service.  He was prepped and draped in standard fashion in preparation for endoscopic endonasal skull base surgery.  We started the procedure by decongesting bilateral nasal cavities with 1:100,000 epinephrine-soaked pledgets.  We  started by performing bilateral nasal endoscopy.  On the right side, there was rivka polyposis of the olfactory cleft, which was limiting the view of the sphenoid ostium.  There was also evidence of mucopurulent drainage and polyposis of the middle meatus.  On the left side, there was rivka polyposis and mucopurulent drainage emanating from the middle meatus.  This was in hdz contrast to his endoscopic findings approximately 6 weeks prior.  We, thus, proceeded with performing revision endoscopic sinus surgery, which was indicated in order to maximize the surgical opening, but also defend against any postoperative complications that could result from postoperative infection or chronic inflammation.    We started by revising the sinuses on the right side.  We performed a maxillary antrostomy, revised the maxillary sinus opening by using a combination of pediatric backbiter, straight through-cutting forceps as well as microdebrider.  There was rivka polyp tissue of the maxillary sinus, which was removed using a microdebrider.  There was thick mucopurulent secretions, which were suctioned using a curved suction.  We then performed a revision total ethmoidectomy by removing residual ethmoid bulla, basal lamella and several posterior ethmoid partitions along with polyps using a combination of angled through-cutting forceps as well as microdebrider.  There was polyposis of the olfactory cleft, which was shaved using a microdebrider.  We identified the sphenoid ostium medial to the superior turbinate and dilated using a [place] elevator.  It was opened widely using straight Hosemann punch as well as angled through-cutting forceps as well as microdebrider.    We then turned our attention to the left side.  There was rivka polyposis or mucopurulent secretions from the middle meatus.  We debrided the polyps towards the middle meatus using a microdebrider.  The maxillary sinus opening was revised using a combination of  pediatric backbiter, straight through-cutting forceps as well as microdebrider.  There was polyp tissue from the  maxillary sinus, which was removed using a combination of debrider as well as angled debrider and non-cutting forceps.  We then performed a revision total ethmoidectomy by removing residual ethmoid bulla, basal lamella and several posterior ethmoid partitions using a combination of angled through-cutting forceps as well as microdebrider.  Sphenoid sinus was opened widely using a combination of [place] elevator, straight Hosemann punch as well as angled through-cutting forceps as well as microdebrider.  The left sphenoid sinus was smaller in relation to the right sphenoid sinus.  A posterior septectomy was then performed using a combination of a needle-tip Bovie electrocautery, straight through-cut as well as [place] elevator.  The sphenoid rostrum was drilled.  The sphenoid sinus mucosa was stripped using a caudal elevator as well as Blakesley forceps.  There was evidence of displacement of the sellar floor into the sphenoid sinus.  Given that this patient had evidence of severe chronic inflammation as well as mucopurulence emanating from the middle meatus, we decided to harvest the nasoseptal flap in preparation for closure after tumor resection had been performed to defend against transmittance of sinonasal inflammation or infection from the sinonasal cavity into the sellar space postoperatively.  Thus, we harvested a right-sided nasoseptal flap, which was pedicled off the posterior septal branch of the sphenopalatine artery.  This was started on the left side; however, it was immediately discovered that this patient had prior evidence of septoplasty.  There were several obvious rents and attenuation of the mucosa on the left side and so we decided to harvest a right-sided pedicled nasoseptal flap.  A superior incision had already been made by performing a posterior septectomy.  The inferior incision was  started at the choanal arch and carried inferiorly into the nasal cavity floor and then was connected with our superior incision at the olfactory cleft.  We painstakingly elevated the nasoseptal flap off the bone and cartilage of the nasal septum.  There was evidence of fused septal mucosa where cartilage had been removed.  This was removed sharply with scissors.  This required extra attention and care in order to not tear the flap, which would make it unusable.  Approximately an extra 30% effort was required to complete this portion of the procedure.  Once the flap was isolated at the pedicle, it was stored in the nasopharynx for later use.      The sphenoid opening was then drilled using high-speed drill.  The intersinus septations were drilled using a high-speed drill.  We were then joined by the neurosurgery service, and together we performed additional drilling and removed the bone of the sella and the left cavernous carotid artery.  An incision was made in the dura in an inverted V fashion.  The tumor was then removed using a combination of 2-handed suction, pituitary forceps as well as ring curette.  Once gross total tumor resection had been achieved, we used a 30-degree telescope to inspect the surgical cavity.  There was no residual tumor noted.  The pituitary gland was identified on the right side of the sella.  Meticulous hemostasis was ensured and then we proceeded with closure.  We fashioned a Duragen graft and placed this in the sellar dural defect.  It was lined with Surgicel.  We then retrieved the nasoseptal flap from the nasopharynx and placed it over the dural defect.  All free edges of the flap made good contact with bone.  It was then lined with Surgicel and bolstered in place using Gelfoam.  A NasoPore sponge was placed in the middle meatus.  I fashioned Cal[place]oun splints and sutured along the septum using a 3-0 nylon suture.    Shadi Phillips MD        D: 05/26/2023   T: 05/26/2023   MT:  rr/rb    Name:     KAREN CACERES  MRN:      [id number]        Account:        [id number]   :      1950           Procedure Date: 2023     Document: [id number]

## 2023-05-26 NOTE — DISCHARGE SUMMARY
[facility] Discharge Summary and Instructions    Bill Daugherty MRN# [id number]   Age: 72 year old YOB: 1950     Date of Admission:  5/25/2023  Date of Discharge::  5/27/2023  Admitting Physician:  Richard Lim MD  Discharge Physician:  Richard Lim MD          Admission Diagnoses:   Pituitary adenoma with extrasellar extension (H) [D35.2]  Pituitary adenoma (H) [D35.2]          Discharge Diagnosis:     Pituitary adenoma with extrasellar extension (H) [D35.2]  Pituitary adenoma (H) [D35.2]         Procedures:   PROCEDURES PERFORMED:  1.  Endoscopic endonasal transphenoidal approach for pituitary adenoma resection  2.  Use of intraoperative Stealth navigation and doppler ultrasound  3.  Neuromonitoring for SSEP, EEG           Brief History of Illness:   Bill Daugherty is a 72 year old male with a history of hypertension and DMII who has had a growing pituituary adenoma for the past 5 months. Of note, he has undergone 3 prior functional endoscopic surgeries for sinus disease. After discussion of benefits and complications. Patient has elected to undergo above-mentioned procedure.           Hospital Course:   Patient underwent above-mentioned procedure on 5/25/23. The operation was uncomplicated and he was doing well and transferred to the floor. Post-operative headache and nausea was managed with medications. Glucose were between 150-170 and insulin was started to manage these levels. On post operative day 2, he was ambulating, voiding without a solis, eating a regular diet, pain was well controlled and therefore he was discharged.          Discharge Medications:     Current Discharge Medication List      CONTINUE these medications which have NOT CHANGED    Details   allopurinol (ZYLOPRIM) 100 MG tablet Take 200 mg by mouth daily (2 x 100 mg = 200 mg)      alogliptin (NESINA) 12.5 MG tablet Take 12.5 mg by mouth every morning      amLODIPine (NORVASC) 10 MG tablet Take 10 mg by  mouth every morning      desonide (DESOWEN) 0.05 % external cream Apply topically 2 times daily as needed      emollient (VANICREAM) external cream Apply topically as needed (for dry skin)      empagliflozin (JARDIANCE) 25 MG TABS tablet Take 12.5 mg by mouth every morning (0.5 x 25 mg = 12.5 mg)      fluticasone (FLONASE) 50 MCG/ACT nasal spray Spray 2 sprays into both nostrils as needed      gabapentin (NEURONTIN) 300 MG capsule Take 300 mg by mouth nightly as needed      losartan (COZAAR) 25 MG tablet Take 25 mg by mouth every morning      Multiple Vitamin (MULTIVITAMIN PO) Take 1 tablet by mouth every morning      mupirocin (BACTROBAN) 2 % external ointment Apply topically as needed      oxymetazoline (AFRIN) 0.05 % nasal spray Spray 2 sprays into both nostrils 3 times daily as needed for congestion      sildenafil (VIAGRA) 100 MG tablet Take 100 mg by mouth as needed (1 hour before sexual activity)      sodium chloride (OCEAN) 0.65 % nasal spray Spray 2 sprays into both nostrils daily as needed for congestion      tamsulosin (FLOMAX) 0.4 MG capsule Take 0.4 mg by mouth every evening      triamcinolone (KENALOG) 0.1 % external ointment Apply topically as needed APPLY THIN LAYER TOPICALLY TWICE A DAY APPLY TO RASH ON RIGHT CHEST TWICE DAILY UNTIL RASH RESOLVES      amoxicillin (AMOXIL) 500 MG capsule TAKE 4 CAPSULES BY MOUTH 1 HOUR BEFORE DENTAL APPOINTMENT                  Discharge Instructions and Follow-Up:     Discharge diet: Regular   Discharge activity: You may advance activity as tolerated. No strenuous exercise or heay lifting greater than 10 lbs for 4 weeks or until seen and cleared in clinic.     Discharge follow-up: Follow-up with Neurosurgery [facility] in 2 weeks for wound check/post-hospital evaluation.    Follow up with ENT on June 7th   Wound care: Ok to shower,however no scrubbing of the wound and no soaking of the wound, meaning no bathtubs or swimming pools. Pat dry only. - We encourage short  frequent walks, increasing as tolerated.  - No driving until you are seen in clinic and cleared by your neurosurgeon.   - No strenuous activity.  - No lifting more than 10 pounds until you are seen in clinic (a gallon of milk weighs approximately 8 pounds)  - Do not blow your nose until seen and evaluated in the ENT clinic.   - Nasal precautions: no straining, no nose blowing, sneeze and cough with mouth open   - Avoid straining and/or constipation. Please take the medicines you were prescribed to help prevent constipation.   - You are ok to shower, but do not soak your incisions. Do not submerge your head in water. Pat them dry if they get damp.   - No baths, hot tubs or pools for 4-6 weeks after surgery.      Exam:  BP (!) 148/58 (BP Location: Left arm)   Pulse 51   Temp 97.8  F (36.6  C) (Oral)   Resp 18   Ht 1.829 m (6')   Wt 131.6 kg (290 lb 2 oz)   SpO2 92%   BMI 39.35 kg/m    Gen: Appears comfortable, NAD  Wound: Sling with serosanguinous discharge  Neurologic:  Alert & Oriented to person, place, time, and situation  Follows commands briskly  Speech fluent, spontaneous. No aphasia or dysarthria.  No gaze preference. No apparent hemineglect.  PERRL, EOMI, visual acuity 20/20 bilaterally  Face symmetric with sensation intact to light touch  Palate elevates symmetrically, uvula midline, tongue protrudes midline  Trapezii muscles 5/5 bilaterally  No pronator drift       Del Tr Bi WE WF Gr   R 5 5 5 5 5 5   L 5 5 5 5 5 5     HF KE KF DF PF EHL   R 5 5 5 5 5 5   L 5 5 5 5 5 5         Sensation intact and symmetric to light touch throughout        Please call if you have:  1. increased pain, redness, drainage, swelling at your incision  2. fevers > 101.5 F degrees  3. with any questions or concerns.  You may reach the Neurosurgery clinic at [phone] during regular work hours. ER at [phone].    and ask for the Neurosurgery Resident on call at [phone], during off hours or weekends.          Discharge Disposition:     Discharged to home        [facility]  Department of Neurosurgery    Jak Lama MD  Neurosurgery Resident, PGY-4    Please contact neurosurgery resident on call with questions.    Dial * [phone], enter [phone] when prompted.

## 2023-05-26 NOTE — PLAN OF CARE
Status: TSS POD #0. stealth assisted Endoscopic endonasal transsellar approach for tumor, Bilateral endoscopic sinus surgery.   Vitals: VSS on oxymask 3L. Hx of sleep apnea. Uses CPAP at night at baseline  Neuros: Intact. A/o x4. Denies N/T, double/blurry vision. Endorses HA. Intermittent nausea. One episode of emesis. Denies metallic taste, dripping back of mouth, confusion.  IV: 2 PIV SL, 1 PIV @ 75 ml/hr  Labs/Electrolytes: Blood glucose   Resp/trach: LS  Diet: Advance to regular diet  Bowel status: Hypoactive BS, BM PTA, not passing gas   : Strict I/O. Hinkle in place  Skin: Splints in place  Pain: Dull headache 3/10. Scheduled Tylenol, PRN oxycodone given   Activity: Up with SBA  Social: Daughter updated  Plan: Continued pain management and strict I/O,

## 2023-05-26 NOTE — PLAN OF CARE
Status: TSS POD #1. stealth assisted Endoscopic endonasal transsellar approach for tumor, Bilateral endoscopic sinus surgery.   Vitals: VSS on oxymask 3L NOC, on RA during the day. Hx of sleep apnea. Uses CPAP at night at baseline  Neuros: A&Ox4. Intermittent nausea. Denies salty/metallic taste.  IV: PIV infusing NS @ 75 mL/hr  Labs/Electrolytes: Blood glucose checks ACHS.  Resp/trach: LS clear.   Diet: Advance to regular diet  Bowel status: Hypoactive BS, BM PTA  : Strict I/O. Hinkle in place. Orders to remove - will pass along to oncoming RN   Skin: Splints in place in nostrils - small to moderate drainage bloody drainage. Nasal sling in place.   Pain: Dull headache 2-3/10. Scheduled tylenol given. Denied needing more pain interventions.  Activity: SBA, GB  Plan: Pain management, strict I/O, continue with current poc

## 2023-05-26 NOTE — PROGRESS NOTES
Otolaryngology Progress Note    Subjective/Intvl events: No acute events overnight. Afebrile, hypertensive w/ -150s, on 3L oxymask. Had 1x episode of emesis with nausea resolved after prn. Feeling a little gassy/nauseaus this AM and encouraged to request anti-nausea meds. Mild headache that is resolving with prn pain meds. Denies blurriness of vision, anterior or posterior drainage, salty or metallic taste in his mouth.    O: /54 (BP Location: Right arm)   Pulse 62   Temp 99.2  F (37.3  C) (Oral)   Resp 16   Ht 1.829 m (6')   Wt 131.6 kg (290 lb 2 oz)   SpO2 93%   BMI 39.35 kg/m     General: Alert and oriented x 3, No acute distress   HEENT: EOMI. HB 1/6. CN V1-V3 intact. Nasal sling in place with minimal bloody drainage. Splints sutured in place. Oropharynx clear without evidence of drainage.   Pulmonary: Breathing non-labored on oxymask, no stridor, no accessory muscle use.      Intake/Output Summary (Last 24 hours) at 5/26/2023 0853  Last data filed at 5/26/2023 0800  UOP about 50ml/hr  Gross per 24 hour   Intake 1880 ml   Output 3790 ml   Net -1910 ml       LABS:  AM labs pending    BMP  Recent Labs   Lab 05/26/23  0751 05/26/23  0630 05/26/23  0201 05/25/23  2252   * 174* 160* 178*     CBCNo lab results found in last 7 days.    A/P: Bill Daugherty is a 72 year old male with a past medical history of HLD, gout, DALLAS (CPAP use) HTN, and DMII who presents for pituitary adenoma s/p endoscopic endonasal resection of adenoma on 5/26/2023. He is recovering well post-operatively and no evidence of CSF leak.    - Serial neuro exams  - Start nasal saline POD#1  - Keflex for 10 days  - Strict I/Os for DI watch  - Follow-up AM labs  - Sinus precautions: No nose blowing, no CPAP, sneeze with mouth open, avoid straining and scheduled bowel regimen  - Rest of care per NSGY primary    -- Patient and above plan discussed with Dr. Alan Henderson, PGY-2  Otolaryngology-Head & Neck Surgery   Please  contact ENT with questions by dialing [phone] and entering job [phone] when prompted.

## 2023-05-26 NOTE — PHARMACY-ADMISSION MEDICATION HISTORY
Pharmacist Admission Medication History    Admission medication history is complete. The information provided in this note is only as accurate as the sources available at the time of the update.    Medication reconciliation/reorder completed by provider prior to medication history? Yes    Information Source(s): Patient, Clinic records and CareEverywhere/SureScripts via in-person    Changes made to PTA medication list:    Added: None    Deleted: None    Changed: None    Allergies reviewed with patient and updates made in EHR: yes    Medication History Completed By: Eveline Paniagua [facility] 5/25/2023 8:51 PM    Prior to Admission medications    Medication Sig Last Dose Taking? Auth Provider Long Term End Date   allopurinol (ZYLOPRIM) 100 MG tablet Take 200 mg by mouth daily (2 x 100 mg = 200 mg) 5/24/2023 Yes Reported, Patient     alogliptin (NESINA) 12.5 MG tablet Take 12.5 mg by mouth every morning 5/24/2023 Yes Reported, Patient Yes    amLODIPine (NORVASC) 10 MG tablet Take 10 mg by mouth every morning 5/24/2023 Yes Reported, Patient Yes    desonide (DESOWEN) 0.05 % external cream Apply topically 2 times daily as needed 5/24/2023 Yes Reported, Patient     emollient (VANICREAM) external cream Apply topically as needed (for dry skin) Past Week Yes Reported, Patient     empagliflozin (JARDIANCE) 25 MG TABS tablet Take 12.5 mg by mouth every morning (0.5 x 25 mg = 12.5 mg) Past Week Yes Reported, Patient     fluticasone (FLONASE) 50 MCG/ACT nasal spray Spray 2 sprays into both nostrils as needed More than a month Yes Reported, Patient     gabapentin (NEURONTIN) 300 MG capsule Take 300 mg by mouth nightly as needed 5/24/2023 Yes Reported, Patient Yes    losartan (COZAAR) 25 MG tablet Take 25 mg by mouth every morning 5/24/2023 Yes Reported, Patient Yes    Multiple Vitamin (MULTIVITAMIN PO) Take 1 tablet by mouth every morning Past Month Yes Reported, Patient     mupirocin (BACTROBAN) 2 % external ointment Apply topically  as needed More than a month Yes Reported, Patient     oxymetazoline (AFRIN) 0.05 % nasal spray Spray 2 sprays into both nostrils 3 times daily as needed for congestion More than a month Yes Reported, Patient     sildenafil (VIAGRA) 100 MG tablet Take 100 mg by mouth as needed (1 hour before sexual activity) More than a month Yes Reported, Patient Yes    sodium chloride (OCEAN) 0.65 % nasal spray Spray 2 sprays into both nostrils daily as needed for congestion More than a month Yes Reported, Patient     tamsulosin (FLOMAX) 0.4 MG capsule Take 0.4 mg by mouth every evening Past Week Yes Reported, Patient     triamcinolone (KENALOG) 0.1 % external ointment Apply topically as needed APPLY THIN LAYER TOPICALLY TWICE A DAY APPLY TO RASH ON RIGHT CHEST TWICE DAILY UNTIL RASH RESOLVES 5/24/2023 Yes Reported, Patient     amoxicillin (AMOXIL) 500 MG capsule TAKE 4 CAPSULES BY MOUTH 1 HOUR BEFORE DENTAL APPOINTMENT   Reported, Patient

## 2023-05-26 NOTE — PROVIDER NOTIFICATION
NSG paged @ [phone] MERLIN Daugherty- patient threw up 150mL of dark red emesis w/ some blood clots.can you also add covid swab?  thx  Noelle MARIE [id number]

## 2023-05-26 NOTE — PROGRESS NOTES
[facility], [place]  Neurosurgery Progress Note:  05/26/2023      Interval History: OR for EEA for resection of pituitary adenoma; one episode of post operative emesis. Nausea improved overnight.    Assessment:  Bill Daugherty is a 72 year old male with a history of HLD, gout, DALLAS (CPAP use since the 1980's), osteoarthritis, hypertension, and DMII who has been followed up in the neurosurgery clinic for growing pituitary adenoma. His tumor grew over the past 5 months and it was recommended to undergo resection through endonasal approach.     Now POD 1 status post EEA for resection of pituitary adenoma.    Clinically Significant Risk Factors                         # Obesity: Estimated body mass index is 39.35 kg/m  as calculated from the following:    Height as of this encounter: 1.829 m (6').    Weight as of this encounter: 131.6 kg (290 lb 2 oz)., PRESENT ON ADMISSION           Plan:  Serial neuro exams  Pain control  Ceftriaxone 24 hrs  Keflex 10 days  DI watch  CSF leak watch  Follow AM cortisol  Follow sodium  Strict I&Os  Nasal precautions  Advance diet as tolerated  Bowel regimen  PRN antiemetics  PT/OT  SCDs for DVT proph    -----------------------------------  Lynne Early MD  Neurosurgery resident, PGY-2  -----------------------------------  Gen: Appears comfortable, NAD  Neurologic:  Alert & Oriented to person, place, time, and situation  Follows commands briskly  Speech fluent, spontaneous. No aphasia or dysarthria.  No gaze preference. No apparent hemineglect.  PERRL, EOMI, visual acuity 20/20 bilaterally  Face symmetric with sensation intact to light touch  Palate elevates symmetrically, uvula midline, tongue protrudes midline  Trapezii muscles 5/5 bilaterally  No pronator drift     Del Tr Bi WE WF Gr   R 5 5 5 5 5 5   L 5 5 5 5 5 5    HF KE KF DF PF EHL   R 5 5 5 5 5 5   L 5 5 5 5 5 5     Reflexes 2+ throughout    Sensation intact and symmetric to light touch  throughout    Objective:   Temp:  [97.5  F (36.4  C)-98.7  F (37.1  C)] 98.7  F (37.1  C)  Pulse:  [65-85] 65  Resp:  [10-22] 16  BP: (136-163)/(61-86) 137/63  MAP:  [102 mmHg-110 mmHg] 108 mmHg  Arterial Line BP: (165-188)/(68-72) 188/69  SpO2:  [90 %-97 %] 96 %  I/O last 3 completed shifts:  In: 2180 [P.O.:380; I.V.:1800]  Out: 4230 [Urine:3880; Emesis/NG output:150; Blood:200]        LABS:  Recent Labs   Lab 05/26/23  0630 05/26/23  0201 05/25/23  2252   * 160* 178*       No lab results found in last 7 days.    IMAGING:  Recent Results (from the past 24 hour(s))   CT Head w/o Contrast    Narrative    CT HEAD W/O CONTRAST, CTA HEAD NECK W CONTRAST 5/25/2023 7:14 AM    CT Head without contrast  CT Angiogram of the Neck with contrast   CT Angiogram of the Head with contrast    History:  pituitary adenoma; Pituitary adenoma with extrasellar  extension (H)  ICD-10: Pituitary adenoma with extrasellar extension (H)    Comparison: Outside MR brain 4/22/2023.     Technique:   HEAD CT: Using multidetector thin collimation helical acquisition  technique, axial, coronal and sagittal CT images were obtained from  the base of the skull to the vertex without intravenous contrast and  reviewed in brain, bone, and subdural windows.     HEAD and NECK CTA: Thereafter, postcontrast images were obtained from  the aortic arch through the [place].  Axial images were  obtained using thin collimation multidetector helical technique. This  CT angiogram data was reconstructed at thin intervals with mild  overlap. Images were sent to the [employer] workstation, and 3D  reconstructions and multiplanar reformations were obtained with  reconstructions performed by the technologist and the radiologist. The  source images, multiplanar reformations, 3D reconstructions in both  maximum intensity projection display and volume rendered models were  reviewed.     Contrast: iopamidol (ISOVUE-370) solution 75 mL    Findings:   Head CT:  Pituitary mass measuring 1.6 x 1.5 x 1.5 cm with extension  into the sphenoid sinus, similar to prior. There is no evidence of  intracranial hemorrhage, mass effect, or midline shift. Gray/white  matter differentiation in both cerebral hemispheres is preserved.  There is mild patchy low attenuation within the periventricular and  supraventricular white matter of both cerebral hemispheres,  nonspecific but most likely representing chronic small vessel ischemic  disease, given the patient's age. The ventricles and sulci are  enlarged but within normal limits for the patient's age, and the  ventricles are not out of proportion to the sulci.     Bilateral moderate maxillary sinus mucosal thickening and debris,  similar to prior. Similar right greater than left ethmoid air cell  opacification and right frontal sinus opacification.    Head CTA demonstrates no aneurysm or stenosis of the major  intracranial arteries. The anterior communicating artery is patent.  Fetal posterior cerebral arteries bilaterally. Left vertebral artery  terminates in the posterior inferior cerebellar artery.    Neck CTA demonstrates no stenosis of the major cervical arteries on  either side. The origins of the great vessels from the aortic arch are  patent. The normal distal right internal carotid artery measures 5 mm.  The normal distal left internal carotid artery measures 5 mm. No  evidence of pulmonary embolism within the visualized portions of the  pulmonary arteries.    No mass is noted within the visualized portions of the cervical soft  tissues. Moderate multilevel degenerative changes of the visualized  spine.      Impression    Impression:   1. Pituitary mass. Increased debris within the sphenoid sinus without  definite tumor extension into the sinus.  2. Head CTA demonstrates no aneurysm or stenosis of the major  intracranial arteries.   3. Neck CTA demonstrates no stenosis of the major cervical arteries.     I have personally  reviewed the examination and initial interpretation  and I agree with the findings.    NACHO DAVID MD         SYSTEM ID:  [id number]   CTA Head Neck w Contrast    Narrative    CT HEAD W/O CONTRAST, CTA HEAD NECK W CONTRAST 5/25/2023 7:14 AM    CT Head without contrast  CT Angiogram of the Neck with contrast   CT Angiogram of the Head with contrast    History:  pituitary adenoma; Pituitary adenoma with extrasellar  extension (H)  ICD-10: Pituitary adenoma with extrasellar extension (H)    Comparison: Outside MR brain 4/22/2023.     Technique:   HEAD CT: Using multidetector thin collimation helical acquisition  technique, axial, coronal and sagittal CT images were obtained from  the base of the skull to the vertex without intravenous contrast and  reviewed in brain, bone, and subdural windows.     HEAD and NECK CTA: Thereafter, postcontrast images were obtained from  the aortic arch through the [place].  Axial images were  obtained using thin collimation multidetector helical technique. This  CT angiogram data was reconstructed at thin intervals with mild  overlap. Images were sent to the [employer] workstation, and 3D  reconstructions and multiplanar reformations were obtained with  reconstructions performed by the technologist and the radiologist. The  source images, multiplanar reformations, 3D reconstructions in both  maximum intensity projection display and volume rendered models were  reviewed.     Contrast: iopamidol (ISOVUE-370) solution 75 mL    Findings:   Head CT: Pituitary mass measuring 1.6 x 1.5 x 1.5 cm with extension  into the sphenoid sinus, similar to prior. There is no evidence of  intracranial hemorrhage, mass effect, or midline shift. Gray/white  matter differentiation in both cerebral hemispheres is preserved.  There is mild patchy low attenuation within the periventricular and  supraventricular white matter of both cerebral hemispheres,  nonspecific but most likely representing chronic  small vessel ischemic  disease, given the patient's age. The ventricles and sulci are  enlarged but within normal limits for the patient's age, and the  ventricles are not out of proportion to the sulci.     Bilateral moderate maxillary sinus mucosal thickening and debris,  similar to prior. Similar right greater than left ethmoid air cell  opacification and right frontal sinus opacification.    Head CTA demonstrates no aneurysm or stenosis of the major  intracranial arteries. The anterior communicating artery is patent.  Fetal posterior cerebral arteries bilaterally. Left vertebral artery  terminates in the posterior inferior cerebellar artery.    Neck CTA demonstrates no stenosis of the major cervical arteries on  either side. The origins of the great vessels from the aortic arch are  patent. The normal distal right internal carotid artery measures 5 mm.  The normal distal left internal carotid artery measures 5 mm. No  evidence of pulmonary embolism within the visualized portions of the  pulmonary arteries.    No mass is noted within the visualized portions of the cervical soft  tissues. Moderate multilevel degenerative changes of the visualized  spine.      Impression    Impression:   1. Pituitary mass. Increased debris within the sphenoid sinus without  definite tumor extension into the sinus.  2. Head CTA demonstrates no aneurysm or stenosis of the major  intracranial arteries.   3. Neck CTA demonstrates no stenosis of the major cervical arteries.     I have personally reviewed the examination and initial interpretation  and I agree with the findings.    NACHO DAVID MD         SYSTEM ID:  [id number]

## 2023-05-27 VITALS
BODY MASS INDEX: 39.3 KG/M2 | RESPIRATION RATE: 18 BRPM | HEART RATE: 51 BPM | SYSTOLIC BLOOD PRESSURE: 148 MMHG | HEIGHT: 72 IN | DIASTOLIC BLOOD PRESSURE: 58 MMHG | WEIGHT: 290.13 LBS | OXYGEN SATURATION: 92 % | TEMPERATURE: 97.8 F

## 2023-05-27 LAB
ANION GAP SERPL CALCULATED.3IONS-SCNC: 11 MMOL/L (ref 7–15)
BUN SERPL-MCNC: 12.7 MG/DL (ref 8–23)
CALCIUM SERPL-MCNC: 9.2 MG/DL (ref 8.8–10.2)
CHLORIDE SERPL-SCNC: 105 MMOL/L (ref 98–107)
CORTIS SERPL-MCNC: 25.8 UG/DL
CREAT SERPL-MCNC: 0.86 MG/DL (ref 0.67–1.17)
DEPRECATED HCO3 PLAS-SCNC: 24 MMOL/L (ref 22–29)
ERYTHROCYTE [DISTWIDTH] IN BLOOD BY AUTOMATED COUNT: 16.4 % (ref 10–15)
GFR SERPL CREATININE-BSD FRML MDRD: >90 ML/MIN/1.73M2
GLUCOSE BLDC GLUCOMTR-MCNC: 157 MG/DL (ref 70–99)
GLUCOSE BLDC GLUCOMTR-MCNC: 165 MG/DL (ref 70–99)
GLUCOSE SERPL-MCNC: 168 MG/DL (ref 70–99)
HCT VFR BLD AUTO: 38.8 % (ref 40–53)
HGB BLD-MCNC: 12 G/DL (ref 13.3–17.7)
MAGNESIUM SERPL-MCNC: 2.1 MG/DL (ref 1.7–2.3)
MCH RBC QN AUTO: 27.6 PG (ref 26.5–33)
MCHC RBC AUTO-ENTMCNC: 30.9 G/DL (ref 31.5–36.5)
MCV RBC AUTO: 89 FL (ref 78–100)
PHOSPHATE SERPL-MCNC: 1.9 MG/DL (ref 2.5–4.5)
PLATELET # BLD AUTO: 217 10E3/UL (ref 150–450)
POTASSIUM SERPL-SCNC: 3.6 MMOL/L (ref 3.4–5.3)
RBC # BLD AUTO: 4.34 10E6/UL (ref 4.4–5.9)
SODIUM SERPL-SCNC: 140 MMOL/L (ref 136–145)
WBC # BLD AUTO: 11.9 10E3/UL (ref 4–11)

## 2023-05-27 PROCEDURE — 83735 ASSAY OF MAGNESIUM: CPT | Performed by: STUDENT IN AN ORGANIZED HEALTH CARE EDUCATION/TRAINING PROGRAM

## 2023-05-27 PROCEDURE — 80048 BASIC METABOLIC PNL TOTAL CA: CPT | Performed by: STUDENT IN AN ORGANIZED HEALTH CARE EDUCATION/TRAINING PROGRAM

## 2023-05-27 PROCEDURE — 250N000013 HC RX MED GY IP 250 OP 250 PS 637: Performed by: STUDENT IN AN ORGANIZED HEALTH CARE EDUCATION/TRAINING PROGRAM

## 2023-05-27 PROCEDURE — 85027 COMPLETE CBC AUTOMATED: CPT | Performed by: STUDENT IN AN ORGANIZED HEALTH CARE EDUCATION/TRAINING PROGRAM

## 2023-05-27 PROCEDURE — 36415 COLL VENOUS BLD VENIPUNCTURE: CPT | Performed by: STUDENT IN AN ORGANIZED HEALTH CARE EDUCATION/TRAINING PROGRAM

## 2023-05-27 PROCEDURE — 84100 ASSAY OF PHOSPHORUS: CPT | Performed by: STUDENT IN AN ORGANIZED HEALTH CARE EDUCATION/TRAINING PROGRAM

## 2023-05-27 PROCEDURE — 250N000013 HC RX MED GY IP 250 OP 250 PS 637: Performed by: NEUROLOGICAL SURGERY

## 2023-05-27 PROCEDURE — 250N000011 HC RX IP 250 OP 636: Performed by: STUDENT IN AN ORGANIZED HEALTH CARE EDUCATION/TRAINING PROGRAM

## 2023-05-27 PROCEDURE — 82533 TOTAL CORTISOL: CPT | Performed by: NEUROLOGICAL SURGERY

## 2023-05-27 RX ORDER — POLYETHYLENE GLYCOL 3350 17 G/17G
17 POWDER, FOR SOLUTION ORAL DAILY
Qty: 510 G | Refills: 0 | Status: SHIPPED | OUTPATIENT
Start: 2023-05-27

## 2023-05-27 RX ORDER — AMOXICILLIN 250 MG
1 CAPSULE ORAL 2 TIMES DAILY
Qty: 28 TABLET | Refills: 0 | Status: SHIPPED | OUTPATIENT
Start: 2023-05-27 | End: 2023-06-10

## 2023-05-27 RX ORDER — OXYCODONE HYDROCHLORIDE 5 MG/1
5 TABLET ORAL EVERY 4 HOURS PRN
Qty: 10 TABLET | Refills: 0 | Status: SHIPPED | OUTPATIENT
Start: 2023-05-27 | End: 2023-05-30

## 2023-05-27 RX ORDER — ACETAMINOPHEN 325 MG/1
650 TABLET ORAL EVERY 4 HOURS PRN
Status: DISCONTINUED | OUTPATIENT
Start: 2023-05-27 | End: 2023-05-27 | Stop reason: HOSPADM

## 2023-05-27 RX ORDER — CEPHALEXIN 500 MG/1
500 CAPSULE ORAL EVERY 6 HOURS
Qty: 40 CAPSULE | Refills: 0 | Status: SHIPPED | OUTPATIENT
Start: 2023-05-27 | End: 2023-06-06

## 2023-05-27 RX ADMIN — INSULIN ASPART 1 UNITS: 100 INJECTION, SOLUTION INTRAVENOUS; SUBCUTANEOUS at 12:49

## 2023-05-27 RX ADMIN — CEPHALEXIN 500 MG: 500 CAPSULE ORAL at 00:00

## 2023-05-27 RX ADMIN — AMLODIPINE BESYLATE 10 MG: 10 TABLET ORAL at 07:54

## 2023-05-27 RX ADMIN — OXYCODONE HYDROCHLORIDE 5 MG: 5 TABLET ORAL at 08:07

## 2023-05-27 RX ADMIN — ONDANSETRON 4 MG: 4 TABLET, ORALLY DISINTEGRATING ORAL at 00:00

## 2023-05-27 RX ADMIN — LOSARTAN POTASSIUM 25 MG: 25 TABLET, FILM COATED ORAL at 07:54

## 2023-05-27 RX ADMIN — ACETAMINOPHEN 975 MG: 325 TABLET ORAL at 03:28

## 2023-05-27 RX ADMIN — BISACODYL 10 MG: 10 SUPPOSITORY RECTAL at 12:46

## 2023-05-27 RX ADMIN — SALINE NASAL SPRAY 1 SPRAY: 1.5 SOLUTION NASAL at 12:45

## 2023-05-27 RX ADMIN — INSULIN ASPART 1 UNITS: 100 INJECTION, SOLUTION INTRAVENOUS; SUBCUTANEOUS at 07:54

## 2023-05-27 RX ADMIN — SALINE NASAL SPRAY 1 SPRAY: 1.5 SOLUTION NASAL at 10:28

## 2023-05-27 RX ADMIN — POLYETHYLENE GLYCOL 3350 17 G: 17 POWDER, FOR SOLUTION ORAL at 07:54

## 2023-05-27 RX ADMIN — POTASSIUM & SODIUM PHOSPHATES POWDER PACK 280-160-250 MG 2 PACKET: 280-160-250 PACK at 10:27

## 2023-05-27 RX ADMIN — SENNOSIDES AND DOCUSATE SODIUM 1 TABLET: 50; 8.6 TABLET ORAL at 07:54

## 2023-05-27 RX ADMIN — SALINE NASAL SPRAY 1 SPRAY: 1.5 SOLUTION NASAL at 07:55

## 2023-05-27 RX ADMIN — ALLOPURINOL 200 MG: 100 TABLET ORAL at 08:02

## 2023-05-27 RX ADMIN — POTASSIUM & SODIUM PHOSPHATES POWDER PACK 280-160-250 MG 1 PACKET: 280-160-250 PACK at 00:01

## 2023-05-27 RX ADMIN — CEPHALEXIN 500 MG: 500 CAPSULE ORAL at 05:51

## 2023-05-27 RX ADMIN — CEPHALEXIN 500 MG: 500 CAPSULE ORAL at 12:45

## 2023-05-27 RX ADMIN — SALINE NASAL SPRAY 1 SPRAY: 1.5 SOLUTION NASAL at 05:51

## 2023-05-27 RX ADMIN — OXYCODONE HYDROCHLORIDE 5 MG: 5 TABLET ORAL at 12:45

## 2023-05-27 RX ADMIN — ACETAMINOPHEN 650 MG: 325 TABLET, FILM COATED ORAL at 12:45

## 2023-05-27 ASSESSMENT — ACTIVITIES OF DAILY LIVING (ADL)
ADLS_ACUITY_SCORE: 22

## 2023-05-27 NOTE — PLAN OF CARE
Status: POD #2 TSS  Vitals: VSS BP <140   Neuros: Intact ex HA.  IV: PIV SL   Labs/Electrolytes: Cortisol draw today at 0600. Electrolyte rechecks.   Resp/trach: LS clear, no SOB reported  Diet: Regular, Zofran PO and compazine IV given for intermittent nausea .   Bowel status: Last BM 5/24, passing gas  : Voided in urinal, outputs WNL.  Skin: Bilateral splints, UTV, small bloody drainage.   Pain: Scheduled Tylenol for pain.  Activity: ad patricia in room.  Plan: Continue to monitor and follow POC

## 2023-05-27 NOTE — PLAN OF CARE
Status: POD #2 TSS  Vitals: VSS on RA, BP <140   Neuros: AO x 4, denies salty/metallic taste  IV: PIV SL   Labs/Electrolytes: Phosphorus replaced, all other redraws scheduled for the AM  Resp/trach: LS clear, no SOB reported  Diet: Regular, fair intake.   Bowel status: Last BM 5/24, passing gas, scheduled Senna + Miralax given. PRN Suppository.   : Voided in urinal, strict I & O's!  Skin: Bilateral splints, UTV, mod bloody drainage.   Pain: Scheduled Tylenol & Oxy given for HA pain, ice utilized.   Activity: SBA   Social: Independently updating family  Plan: Patient was discharged to home, family provided transportation. Aide provided escort to main ED doors, medications picked up from [place] discharge pharmacy. All belongings sent with patient, AVS printed and patient stated no questions, daughter on the phone for discharge education. Supplies sent home with patient for nasal sling.

## 2023-05-27 NOTE — CARE PLAN
Charge nurse notified regarding pt emesis x1. Blood with clot appearance. Charged FYI primary team.    
Occupational Therapy Discharge Summary    Reason for therapy discharge:    Discharged to home.    Progress towards therapy goal(s). See goals on Care Plan in [facility] electronic health record for goal details.  Goals partially met.  Barriers to achieving goals:   discharge from facility.    Therapy recommendation(s):    No further therapy is recommended.      
no

## 2023-05-27 NOTE — PROGRESS NOTES
Otolaryngology Progress Note  5/27/2023    Subjective/Intvl events: No acute events overnight. Remains afebrile, vitally stable on room air and 2 L oxymask at night. No further episodes of emesis, no nausea endorsed this morning. Denies headaches or salty/metallic taste, no acute vision changes.    O: /62 (BP Location: Left arm)   Pulse 53   Temp 98.9  F (37.2  C) (Axillary)   Resp 16   Ht 1.829 m (6')   Wt 131.6 kg (290 lb 2 oz)   SpO2 93%   BMI 39.35 kg/m     General: Alert and oriented x3, no acute distress.   HEENT: EOMI. HB 1/6. CN V1-V3 intact. Minimal bloody nasal drainage. Splints sutured in place. Oropharynx clear without evidence of drainage.   Pulmonary: Breathing non-labored on oxymask, no stridor, no accessory muscle use.    Intake/Output Summary (Last 24 hours) at 5/27/2023 0807  Last data filed at 5/27/2023 0801  Gross per 24 hour   Intake 2175 ml   Output 2325 ml   Net -150 ml     LABS: AM BMP, CBC, Mg, Phos, and cortisol pending.    BMP  Recent Labs   Lab 05/26/23  2102 05/26/23  1706 05/26/23  1313 05/26/23  1144   NA  --   --   --  138   POTASSIUM  --   --   --  3.8   CHLORIDE  --   --   --  101   NAVEEN  --   --   --  8.6*   CO2  --   --   --  23   BUN  --   --   --  15.3   CR  --   --   --  1.07   * 141* 152* 155*     CBC  Recent Labs   Lab 05/26/23  1144   WBC 14.1*   RBC 4.20*   HGB 11.6*   HCT 36.2*   MCV 86   MCH 27.6   MCHC 32.0   RDW 16.4*        A/P: Bill Daugherty is a 72-year-old man with a past medical history of HLD, gout, DALLAS (CPAP use) HTN, and DMII who presents for pituitary adenoma s/p endoscopic endonasal resection of adenoma on 5/26/2023. He is recovering well post-operatively and no evidence of CSF leak.    - Serial neuro exams  - Nasal saline started POD#1  - Keflex for 10 days  - Strict I/Os for DI watch  - Follow-up AM labs  - Sinus precautions: No nose blowing, no CPAP, sneeze with mouth open, avoid straining and scheduled bowel regimen  - Rest of care  per NSGY primary    -- Patient and above plan discussed with Dr. Phillips.    Maryse Ponce MD PGY-5  Otolaryngology-Head & Neck Surgery   Please contact ENT with questions by dialing [phone] and entering job code [phone] when prompted.

## 2023-05-27 NOTE — PROGRESS NOTES
[facility], [place]  Neurosurgery Progress Note:  05/27/2023      Interval History: No acute events overnight. Ongoing intermittent bloody drainage from nose.    Assessment:  Bill Daugherty is a 72 year old male with a history of HLD, gout, DALLAS (CPAP use since the 1980's), osteoarthritis, hypertension, and DMII who has been followed up in the neurosurgery clinic for growing pituitary adenoma. His tumor grew over the past 5 months and it was recommended to undergo resection through endonasal approach.     Now POD 2 status post EEA for resection of pituitary adenoma.    Clinically Significant Risk Factors                         # Obesity: Estimated body mass index is 39.35 kg/m  as calculated from the following:    Height as of this encounter: 1.829 m (6').    Weight as of this encounter: 131.6 kg (290 lb 2 oz)., PRESENT ON ADMISSION           Plan:  Serial neuro exams  Pain control  Keflex 10 days  DI watch  CSF leak watch  Follow AM cortisol  Follow sodium  Strict I&Os  Nasal precautions  Advance diet as tolerated  Bowel regimen  PRN antiemetics  PT/OT  SCDs for DVT proph    -----------------------------------  Lynne Early MD  Neurosurgery resident, PGY-2  -----------------------------------  Gen: Appears comfortable, NAD  Neurologic:  Alert & Oriented to person, place, time, and situation  Follows commands briskly  Speech fluent, spontaneous. No aphasia or dysarthria.  No gaze preference. No apparent hemineglect.  PERRL, EOMI, visual acuity 20/20 bilaterally  Face symmetric with sensation intact to light touch  Palate elevates symmetrically, uvula midline, tongue protrudes midline  Trapezii muscles 5/5 bilaterally  No pronator drift     Del Tr Bi WE WF Gr   R 5 5 5 5 5 5   L 5 5 5 5 5 5    HF KE KF DF PF EHL   R 5 5 5 5 5 5   L 5 5 5 5 5 5     Reflexes 2+ throughout    Sensation intact and symmetric to light touch throughout    Objective:   Temp:  [97.5  F (36.4  C)-99.2  F (37.3   C)] 97.8  F (36.6  C)  Pulse:  [52-65] 52  Resp:  [16-17] 16  BP: (121-139)/(51-63) 136/51  SpO2:  [92 %-97 %] 97 %  I/O last 3 completed shifts:  In: 2135 [P.O.:2135]  Out: 1610 [Urine:1610]        LABS:  Recent Labs   Lab 05/26/23  2102 05/26/23  1706 05/26/23  1313 05/26/23  1144   NA  --   --   --  138   POTASSIUM  --   --   --  3.8   CHLORIDE  --   --   --  101   CO2  --   --   --  23   ANIONGAP  --   --   --  14   * 141* 152* 155*   BUN  --   --   --  15.3   CR  --   --   --  1.07   NAVEEN  --   --   --  8.6*       Recent Labs   Lab 05/26/23  1144   WBC 14.1*   RBC 4.20*   HGB 11.6*   HCT 36.2*   MCV 86   MCH 27.6   MCHC 32.0   RDW 16.4*          IMAGING:  No results found for this or any previous visit (from the past 24 hour(s)).

## 2023-05-30 DIAGNOSIS — D35.2 PITUITARY ADENOMA (H): ICD-10-CM

## 2023-05-30 RX ORDER — OXYCODONE HYDROCHLORIDE 5 MG/1
5 TABLET ORAL EVERY 4 HOURS PRN
Qty: 15 TABLET | Refills: 0 | Status: SHIPPED | OUTPATIENT
Start: 2023-05-30 | End: 2023-06-07

## 2023-05-30 NOTE — PROGRESS NOTES
Sent refill for oxycodone as requested.   Shadi Phillips MD    [facility]  Center for Skull Base and Pituitary Surgery  [facility]  Department of Otolaryngology - Head & Neck Surgery

## 2023-06-01 ENCOUNTER — LAB (OUTPATIENT)
Dept: LAB | Facility: CLINIC | Age: 73
End: 2023-06-01
Payer: COMMERCIAL

## 2023-06-01 DIAGNOSIS — D35.2 PITUITARY ADENOMA (H): ICD-10-CM

## 2023-06-01 LAB — SODIUM SERPL-SCNC: 139 MMOL/L (ref 136–145)

## 2023-06-01 PROCEDURE — 36415 COLL VENOUS BLD VENIPUNCTURE: CPT

## 2023-06-01 PROCEDURE — 84295 ASSAY OF SERUM SODIUM: CPT

## 2023-06-02 LAB
PATH REPORT.COMMENTS IMP SPEC: ABNORMAL
PATH REPORT.COMMENTS IMP SPEC: YES
PATH REPORT.FINAL DX SPEC: ABNORMAL
PATH REPORT.GROSS SPEC: ABNORMAL
PATH REPORT.MICROSCOPIC SPEC OTHER STN: ABNORMAL
PATH REPORT.RELEVANT HX SPEC: ABNORMAL
PHOTO IMAGE: ABNORMAL

## 2023-06-07 ENCOUNTER — OFFICE VISIT (OUTPATIENT)
Dept: NEUROSURGERY | Facility: CLINIC | Age: 73
End: 2023-06-07
Payer: COMMERCIAL

## 2023-06-07 ENCOUNTER — OFFICE VISIT (OUTPATIENT)
Dept: OTOLARYNGOLOGY | Facility: CLINIC | Age: 73
End: 2023-06-07
Payer: COMMERCIAL

## 2023-06-07 VITALS
DIASTOLIC BLOOD PRESSURE: 62 MMHG | WEIGHT: 274 LBS | HEART RATE: 63 BPM | SYSTOLIC BLOOD PRESSURE: 122 MMHG | BODY MASS INDEX: 37.16 KG/M2

## 2023-06-07 VITALS
BODY MASS INDEX: 37.84 KG/M2 | SYSTOLIC BLOOD PRESSURE: 121 MMHG | DIASTOLIC BLOOD PRESSURE: 72 MMHG | HEART RATE: 62 BPM | OXYGEN SATURATION: 96 % | WEIGHT: 279 LBS

## 2023-06-07 DIAGNOSIS — Z98.890 HISTORY OF SINUS SURGERY: ICD-10-CM

## 2023-06-07 DIAGNOSIS — E66.01 MORBID OBESITY (H): ICD-10-CM

## 2023-06-07 DIAGNOSIS — J32.2 CHRONIC ETHMOIDAL SINUSITIS: ICD-10-CM

## 2023-06-07 DIAGNOSIS — D35.2 PITUITARY ADENOMA (H): Primary | ICD-10-CM

## 2023-06-07 DIAGNOSIS — J32.1 CHRONIC FRONTAL SINUSITIS: ICD-10-CM

## 2023-06-07 PROCEDURE — 99024 POSTOP FOLLOW-UP VISIT: CPT | Performed by: PHYSICIAN ASSISTANT

## 2023-06-07 PROCEDURE — 31237 NSL/SINS NDSC SURG BX POLYPC: CPT | Mod: 50 | Performed by: OTOLARYNGOLOGY

## 2023-06-07 NOTE — PATIENT INSTRUCTIONS
You were seen in the ENT Clinic today by Dr. Phillips. If you have any questions or concerns after your appointment, please contact us (see below)       2.   The following recommendations have been made based upon your appointment today:   -Perform sinus rinses twice daily. Additional instructions are included below.      3.   Please return to the ENT clinic in 4 weeks.           How to Contact Us:  Send a [employer] message to your provider. Our team will respond to you via [employer]. Occasionally, we will need to call you to get further information.  For urgent matters (Monday-Friday), call the ENT Clinic: [phone] and speak with a call center team member - they will route your call appropriately.   If you'd like to speak directly with a nurse, please find our contact information below. We do our best to check voicemail frequently throughout the day, and will work to call you back within 1-2 days. For urgent matters, please use the general clinic phone numbers listed above.        Ruth Ann OJEDA RN  ENT RN Care Coordinator  Direct: [phone]  Milena ZHU LPN  Direct: [phone]         [facility]  Department of Otolaryngology

## 2023-06-07 NOTE — PROGRESS NOTES
[facility]                       Return Visit      Encounter date: June 7, 2023    Referring Provider: Richard Lim    Chief Complaint:   CRSwNP  Pituitary Macroadenoma    ID:  CRSwNP  Pituitary Macroadenoma      Interval History: Bill Daugherty presents for first postoperative visit  He is doing well  Admits to profound nasal obstruction from the packing  No smell  Would like to restart CPAP again      [place] Operative History  Procedure Date: 05/25/2023     PREOPERATIVE DIAGNOSES:     1.  Chronic pansinusitis with nasal polyposis.  2.  Pituitary macroadenoma.  3.  History of prior endoscopic sinonasal surgery.     PREOPERATIVE DIAGNOSES:    1.  Chronic pansinusitis with nasal polyposis.  2.  Pituitary macroadenoma.  3.  History of prior endoscopic sinonasal surgery.     PROCEDURE PERFORMED:    1.  Bilateral revision endoscopic total ethmoidectomy, sphenoidotomy (ENT only).  2.  Bilateral revision endoscopic maxillary antrostomy with tissue removal (ENT only).  3.  Endoscopic endonasal transsellar approach for resection of pituitary macroadenoma.  4.  [place] of right-sided pedicled nasoseptal flap pedicled off the posterior septal branch of the sphenopalatine artery -- modifier 22.     SURGEON:  Shadi Phillips MD     CO-SURGEON:  Richard Lim MD    Review of systems: A 14-point review of systems has been conducted and is negative for any notable symptoms, except as dictated in the history of present illness.     Physical Exam:  Vital signs: There were no vitals taken for this visit.   General Appearance: No acute distress, appropriate demeanor, conversant  Eyes: moist conjunctivae; EOMI; pupils symmetric; visual acuity grossly intact; no proptosis  Head: normocephalic; overall symmetric appearance without deformity  Face: overall symmetric without deformity; HB I/VI  Ears: Normal appearance of external ear;   Nose: No external deformity;   See nasal  endoscopy    Procedure Note  Procedure performed: Rigid nasal endoscopy with bilateral debridement  Indication: To evaluate for sinonasal pathology not visualized on routine anterior rhinoscopy  Anesthesia: 4% topical lidocaine with 0.05 % oxymetazoline  Description of procedure: A 30 degree, 3 mm rigid endoscope was inserted into bilateral nasal cavities and the nasal valves, nasal cavity, middle meatus, sphenoethmoid recess, nasopharynx were evaluated for evidence of obstruction, edema, purulence, polyps and/or mass/lesion.     I then used a combination of non-cutting forceps, straight and curved suction to clear bilateral surgical cavities of packing, clot, crust, and fibrinopurulent debris.       Findings  Well-healing surgical cavity  Polypoid edema of bilateral ethmoid cavities  Septal flap visualized very well healing and viable at the skull base    The patient tolerated the procedure well without complication.     Laboratory Review:  n/a    Imaging Review:  n/a    Pathology Review:  n/a    Assessment/Medical Decision Making:  Pituitary macroadenoma  CRSwNP  Status post revision sinus surgery and pituitary macroadenoma resection as above    Continued and routine endoscopy with debridement is indicated post-operatively to evaluate for CSF leak and defend against post-operative surgical site infection and/or untoward healing.       Plan:  Nasal endoscopy with debridement today -septal flap well-positioned again skull base.  No evidence of CSF leak.  Start nasal saline irrigations.  We discussed trialing of CPAP for 1 hour while awake and he should stop for any concerning symptoms like severe headache.  Return to clinic in 4 weeks, but he knows to reach out to us for any other concerns sooner      Shadi Phillips MD    [facility]  Center for Skull Base and Pituitary Surgery  [facility]  Department of Otolaryngology - Head & Neck Surgery    Additional portions of the  patient's have been reviewed below.   ~~~~~~~~~~~~~~~~~~~~~~~~~~~~~~~~~~~~~~~~~~~~~~~~~~~~~~~~~~~~~~~~~~~~~~~~~~~~~~~~~~~~~~~~~~~~~~~~~~~~~~~~~~~~~~~~~~~~~~~~~~~~~~~~~~~~~~~    Past Medical History:   Diagnosis Date     Bilateral low back pain with left-sided sciatica      BPH (benign prostatic hyperplasia)      Diabetes mellitus, type 2 (H)      Gout      History of pancreatitis      HLP (hyperkeratosis lenticularis perstans)      HTN (hypertension)      Nephrolithiasis      Obesity      DALLAS (obstructive sleep apnea)     uses cpap     Pituitary macroadenoma (H)      Pulmonary nodules      PVD (posterior vitreous detachment), unspecified laterality      Shoulder pain      Steatosis of liver         Past Surgical History:   Procedure Laterality Date     ARTHROPLASTY KNEE Right 2023    Procedure: RIGHT TOTAL KNEE ARTHROPLASTY;  Surgeon: Anseth, Scott Duane, MD;  Location: [facility] OR     CHOLECYSTECTOMY       ENDOSCOPIC RETROGRADE CHOLANGIOPANCREATOGRAPHY       OPTICAL TRACKING SYSTEM ENDOSCOPIC ENDONASAL SURGERY Bilateral 2023    Procedure: stealth assisted Endoscopic endonasal transsellar approach for tumor, Bilateral endoscopic sinus surgery;  Surgeon: Richard Lim MD;  Location: [facility] OR     septoplasty and turbinate reduction Bilateral      TOTAL KNEE ARTHROPLASTY Left         Family History   Problem Relation Age of Onset     Pulmonary Embolism Father         post surgical     Anesthesia Reaction No family hx of         Social History     Socioeconomic History     Marital status:    Tobacco Use     Smoking status: Former     Types: Cigarettes     Quit date:      Years since quittin.4     Smokeless tobacco: Never   Vaping Use     Vaping status: Never Used     Passive vaping exposure: Yes   Substance and Sexual Activity     Alcohol use: Yes     Comment: rarely     Drug use: Never     Sexual activity: Never

## 2023-06-07 NOTE — LETTER
6/7/2023       RE: Bill Daugherty  [address] [place] [address]     Dear Colleague,    Thank you for referring your patient, Bill Daugherty, to the [facility] [place] at [facility]. Please see a copy of my visit note below.                      [facility]                         Return Visit      Encounter date: June 7, 2023    Referring Provider: Richard Lim    Chief Complaint:   CRSwNP  Pituitary Macroadenoma    ID:  CRSwNP  Pituitary Macroadenoma      Interval History: Bill Daugherty presents for first postoperative visit  He is doing well  Admits to profound nasal obstruction from the packing  No smell  Would like to restart CPAP again      [place] Operative History  Procedure Date: 05/25/2023     PREOPERATIVE DIAGNOSES:     1.  Chronic pansinusitis with nasal polyposis.  2.  Pituitary macroadenoma.  3.  History of prior endoscopic sinonasal surgery.     PREOPERATIVE DIAGNOSES:    1.  Chronic pansinusitis with nasal polyposis.  2.  Pituitary macroadenoma.  3.  History of prior endoscopic sinonasal surgery.     PROCEDURE PERFORMED:    1.  Bilateral revision endoscopic total ethmoidectomy, sphenoidotomy (ENT only).  2.  Bilateral revision endoscopic maxillary antrostomy with tissue removal (ENT only).  3.  Endoscopic endonasal transsellar approach for resection of pituitary macroadenoma.  4.  [place] of right-sided pedicled nasoseptal flap pedicled off the posterior septal branch of the sphenopalatine artery -- modifier 22.     SURGEON:  Shadi Phillips MD     CO-SURGEON:  Richard Lim MD    Review of systems: A 14-point review of systems has been conducted and is negative for any notable symptoms, except as dictated in the history of present illness.     Physical Exam:  Vital signs: There were no vitals taken for this visit.   General Appearance: No acute distress, appropriate demeanor,  conversant  Eyes: moist conjunctivae; EOMI; pupils symmetric; visual acuity grossly intact; no proptosis  Head: normocephalic; overall symmetric appearance without deformity  Face: overall symmetric without deformity; HB I/VI  Ears: Normal appearance of external ear;   Nose: No external deformity;   See nasal endoscopy    Procedure Note  Procedure performed: Rigid nasal endoscopy with bilateral debridement  Indication: To evaluate for sinonasal pathology not visualized on routine anterior rhinoscopy  Anesthesia: 4% topical lidocaine with 0.05 % oxymetazoline  Description of procedure: A 30 degree, 3 mm rigid endoscope was inserted into bilateral nasal cavities and the nasal valves, nasal cavity, middle meatus, sphenoethmoid recess, nasopharynx were evaluated for evidence of obstruction, edema, purulence, polyps and/or mass/lesion.     I then used a combination of non-cutting forceps, straight and curved suction to clear bilateral surgical cavities of packing, clot, crust, and fibrinopurulent debris.       Findings  Well-healing surgical cavity  Polypoid edema of bilateral ethmoid cavities  Septal flap visualized very well healing and viable at the skull base    The patient tolerated the procedure well without complication.     Laboratory Review:  n/a    Imaging Review:  n/a    Pathology Review:  n/a    Assessment/Medical Decision Making:  Pituitary macroadenoma  CRSwNP  Status post revision sinus surgery and pituitary macroadenoma resection as above    Continued and routine endoscopy with debridement is indicated post-operatively to evaluate for CSF leak and defend against post-operative surgical site infection and/or untoward healing.       Plan:  Nasal endoscopy with debridement today -septal flap well-positioned again skull base.  No evidence of CSF leak.  Start nasal saline irrigations.  We discussed trialing of CPAP for 1 hour while awake and he should stop for any concerning symptoms like severe  headache.  Return to clinic in 4 weeks, but he knows to reach out to us for any other concerns sooner      Shadi Phillips MD    [facility]  Center for Skull Base and Pituitary Surgery  [facility]  Department of Otolaryngology - Head & Neck Surgery    Additional portions of the patient's have been reviewed below.   ~~~~~~~~~~~~~~~~~~~~~~~~~~~~~~~~~~~~~~~~~~~~~~~~~~~~~~~~~~~~~~~~~~~~~~~~~~~~~~~~~~~~~~~~~~~~~~~~~~~~~~~~~~~~~~~~~~~~~~~~~~~~~~~~~~~~~~~    Past Medical History:   Diagnosis Date    Bilateral low back pain with left-sided sciatica     BPH (benign prostatic hyperplasia)     Diabetes mellitus, type 2 (H)     Gout     History of pancreatitis     HLP (hyperkeratosis lenticularis perstans)     HTN (hypertension)     Nephrolithiasis     Obesity     DALLAS (obstructive sleep apnea)     uses cpap    Pituitary macroadenoma (H)     Pulmonary nodules     PVD (posterior vitreous detachment), unspecified laterality     Shoulder pain     Steatosis of liver         Past Surgical History:   Procedure Laterality Date    ARTHROPLASTY KNEE Right 2023    Procedure: RIGHT TOTAL KNEE ARTHROPLASTY;  Surgeon: Anseth, Scott Duane, MD;  Location:  OR    CHOLECYSTECTOMY      ENDOSCOPIC RETROGRADE CHOLANGIOPANCREATOGRAPHY      OPTICAL TRACKING SYSTEM ENDOSCOPIC ENDONASAL SURGERY Bilateral 2023    Procedure: stealth assisted Endoscopic endonasal transsellar approach for tumor, Bilateral endoscopic sinus surgery;  Surgeon: Richard Lim MD;  Location: [facility] OR    septoplasty and turbinate reduction Bilateral     TOTAL KNEE ARTHROPLASTY Left         Family History   Problem Relation Age of Onset    Pulmonary Embolism Father         post surgical    Anesthesia Reaction No family hx of         Social History     Socioeconomic History    Marital status:    Tobacco Use    Smoking status: Former     Types: Cigarettes     Quit date:      Years since quittin.4     Smokeless tobacco: Never   Vaping Use    Vaping status: Never Used     Passive vaping exposure: Yes   Substance and Sexual Activity    Alcohol use: Yes     Comment: rarely    Drug use: Never    Sexual activity: Never                Again, thank you for allowing me to participate in the care of your patient.      Sincerely,    Shadi Phillips MD

## 2023-06-07 NOTE — PROGRESS NOTES
[facility]  Department of Neurosurgery  Center for Skull Base and Pituitary Surgery    Name: Bill Daugherty  MRN: [id number]  Age: 72 year old  : 1950      Chief Complaint:   Pituitary adenoma s/p endoscopic endonasal transphenoidal approach for resection 2023, 2 week postoperative visit     History of Present Illness:   Bill Daugherty is a 72 year old male with a history of HLD, gout, DALLAS, hypertension, and DMII who is seen today for a 2 week postoperative visit. He underwent the above mentioned procedure with Jeffery Lim and Alan after his adenoma was found to be growing. He did well postoperatively and discharged home 2023. Pathology revealed gonadotroph adenoma. Today he's here with his daughter. He's had a difficult time with sleep due to his longstanding CPap use but otherwise is feeling ok. He had splints/packing removed this morning and feels a bit better. He denies new headaches, paresthesias, or increased nasal drainage. No new fever.   [facility] Needs to schedule his follow up there.     Review of Systems:   Pertinent items are noted in HPI or as in patient entered ROS below, remainder of complete ROS is negative.     Physical Exam:   /72   Pulse 62   Wt 126.6 kg (279 lb)   SpO2 96%   BMI 37.84 kg/m    General: No acute distress.    Eyes: Conjunctivae are normal.  MSK: Moves all extremities.  No obvious deformity.  Neuro: The patient is fully oriented. Speech is normal. Facial nerve function is normal, rated as a [place] 1. Gait is normal.   Psych: Normal mood and affect. Behavior is normal.      Imaging:  No new imaging to review today     Assessment:  Pituitary adenoma s/p endoscopic endonasal transphenoidal approach for resection 2023, 2 week postoperative visit     Plan:  We discussed follow up plan and restrictions. Will see him back in 3 months with MRI prior to appointment, and he was encouraged to  reach out sooner with new questions or concerns.        Diamond Garza PA-C  Department of Neurosurgery

## 2023-07-12 ENCOUNTER — OFFICE VISIT (OUTPATIENT)
Dept: OTOLARYNGOLOGY | Facility: CLINIC | Age: 73
End: 2023-07-12
Payer: COMMERCIAL

## 2023-07-12 VITALS
OXYGEN SATURATION: 99 % | SYSTOLIC BLOOD PRESSURE: 121 MMHG | HEIGHT: 72 IN | BODY MASS INDEX: 37.79 KG/M2 | HEART RATE: 58 BPM | DIASTOLIC BLOOD PRESSURE: 65 MMHG | WEIGHT: 279 LBS

## 2023-07-12 DIAGNOSIS — J32.1 CHRONIC FRONTAL SINUSITIS: Primary | ICD-10-CM

## 2023-07-12 DIAGNOSIS — E66.01 MORBID OBESITY (H): ICD-10-CM

## 2023-07-12 DIAGNOSIS — J33.9 NASAL POLYP: ICD-10-CM

## 2023-07-12 DIAGNOSIS — D35.2 PITUITARY ADENOMA (H): ICD-10-CM

## 2023-07-12 PROCEDURE — 31237 NSL/SINS NDSC SURG BX POLYPC: CPT | Mod: 50 | Performed by: OTOLARYNGOLOGY

## 2023-07-12 RX ORDER — DOXYCYCLINE HYCLATE 100 MG
100 TABLET ORAL 2 TIMES DAILY
Qty: 20 TABLET | Refills: 0 | Status: SHIPPED | OUTPATIENT
Start: 2023-07-12 | End: 2023-07-22

## 2023-07-12 RX ORDER — PREDNISONE 20 MG/1
TABLET ORAL
Qty: 10 TABLET | Refills: 0 | Status: SHIPPED | OUTPATIENT
Start: 2023-07-12

## 2023-07-12 ASSESSMENT — PAIN SCALES - GENERAL: PAINLEVEL: NO PAIN (0)

## 2023-07-12 NOTE — PROGRESS NOTES
[facility]                       Return Visit      Encounter date: July 12, 2023    Referring Provider: Richard Lim    Chief Complaint:   CRSwNP  Pituitary Macroadenoma    ID:  CRSwNP  Pituitary Macroadenoma      Interval History: Bill Daugherty presents for first postoperative visit  Using full face CPAP without issue  Complains of nasal obstruction and left-sided pressure  No smell or taste    [place] Operative [facility]  Procedure Date: 05/25/2023     PREOPERATIVE DIAGNOSES:     1.  Chronic pansinusitis with nasal polyposis.  2.  Pituitary macroadenoma.  3.  History of prior endoscopic sinonasal surgery.     PREOPERATIVE DIAGNOSES:    1.  Chronic pansinusitis with nasal polyposis.  2.  Pituitary macroadenoma.  3.  History of prior endoscopic sinonasal surgery.     PROCEDURE PERFORMED:    1.  Bilateral revision endoscopic total ethmoidectomy, sphenoidotomy (ENT only).  2.  Bilateral revision endoscopic maxillary antrostomy with tissue removal (ENT only).  3.  Endoscopic endonasal transsellar approach for resection of pituitary macroadenoma.  4.  [place] of right-sided pedicled nasoseptal flap pedicled off the posterior septal branch of the sphenopalatine artery -- modifier 22.     SURGEON:  Shadi Phillips MD     CO-SURGEON:  Richard Lim MD    Review of systems: A 14-point review of systems has been conducted and is negative for any notable symptoms, except as dictated in the history of present illness.     Physical Exam:  Vital signs: There were no vitals taken for this visit.   General Appearance: No acute distress, appropriate demeanor, conversant  Eyes: moist conjunctivae; EOMI; pupils symmetric; visual acuity grossly intact; no proptosis  Head: normocephalic; overall symmetric appearance without deformity  Face: overall symmetric without deformity; HB I/VI  Ears: Normal appearance of external ear;   Nose: No external deformity;   See nasal endoscopy    Procedure  Note  Procedure performed: Rigid nasal endoscopy with bilateral debridement  Indication: To evaluate for sinonasal pathology not visualized on routine anterior rhinoscopy  Anesthesia: 4% topical lidocaine with 0.05 % oxymetazoline  Description of procedure: A 30 degree, 3 mm rigid endoscope was inserted into bilateral nasal cavities and the nasal valves, nasal cavity, middle meatus, sphenoethmoid recess, nasopharynx were evaluated for evidence of obstruction, edema, purulence, polyps and/or mass/lesion.     I then used a combination of non-cutting forceps, straight and curved suction to clear bilateral surgical cavities of packing, clot, crust, and fibrinopurulent debris.       Findings  Septal flap well-healed against the skull base  No evidence of CSF leak  Bilateral polyps of the ethmoid cavity  There is polyp formation in the left maxillary sinus and inferior meatus, somewhat reminiscent of inverted papilloma      The patient tolerated the procedure well without complication.     Laboratory Review:  n/a    Imaging Review:  n/a    Pathology Review:  n/a    Assessment/Medical Decision Making:  Pituitary macroadenoma  CRSwNP  Status post revision sinus surgery and pituitary macroadenoma resection as above    Continued and routine endoscopy with debridement is indicated post-operatively to evaluate for CSF leak and defend against post-operative surgical site infection and/or untoward healing.       Plan:  Nasal endoscopy with debridement today -septal flap well-positioned again skull base.  No evidence of CSF leak.  Continue irrigations  Continue CPAP.  May use nasal pillow  Active sinus flare-doxycycline 10 days, tobramycin rinses, prednisone burst  Watch left inferior meatus-consider biopsy if no decrease in polyp formation after steroids  To clinic in 4 weeks    Shadi Phillips MD    [facility]  Center for Skull Base and Pituitary Surgery  [facility]  Department of  Otolaryngology - Head & Neck Surgery    Additional portions of the patient's have been reviewed below.   ~~~~~~~~~~~~~~~~~~~~~~~~~~~~~~~~~~~~~~~~~~~~~~~~~~~~~~~~~~~~~~~~~~~~~~~~~~~~~~~~~~~~~~~~~~~~~~~~~~~~~~~~~~~~~~~~~~~~~~~~~~~~~~~~~~~~~~~    Past Medical History:   Diagnosis Date     Bilateral low back pain with left-sided sciatica      BPH (benign prostatic hyperplasia)      Diabetes mellitus, type 2 (H)      Gout      History of pancreatitis      HLP (hyperkeratosis lenticularis perstans)      HTN (hypertension)      Nephrolithiasis      Obesity      DALLAS (obstructive sleep apnea)     uses cpap     Pituitary macroadenoma (H)      Pulmonary nodules      PVD (posterior vitreous detachment), unspecified laterality      Shoulder pain      Steatosis of liver         Past Surgical History:   Procedure Laterality Date     ARTHROPLASTY KNEE Right 2023    Procedure: RIGHT TOTAL KNEE ARTHROPLASTY;  Surgeon: Anseth, Scott Duane, MD;  Location: [facility] OR     CHOLECYSTECTOMY       ENDOSCOPIC RETROGRADE CHOLANGIOPANCREATOGRAPHY       OPTICAL TRACKING SYSTEM ENDOSCOPIC ENDONASAL SURGERY Bilateral 2023    Procedure: stealth assisted Endoscopic endonasal transsellar approach for tumor, Bilateral endoscopic sinus surgery;  Surgeon: Richard Lim MD;  Location: [facility] OR     septoplasty and turbinate reduction Bilateral      TOTAL KNEE ARTHROPLASTY Left         Family History   Problem Relation Age of Onset     Pulmonary Embolism Father         post surgical     Anesthesia Reaction No family hx of         Social History     Socioeconomic History     Marital status:    Tobacco Use     Smoking status: Former     Types: Cigarettes     Quit date: 1980     Years since quittin.5     Smokeless tobacco: Never   Vaping Use     Vaping Use: Never used   Substance and Sexual Activity     Alcohol use: Yes     Comment: rarely     Drug use: Never     Sexual activity: Never

## 2023-07-12 NOTE — NURSING NOTE
Patient had questions on checkout with our schedulers regarding his restrictions post operatively.    This writer verified with provider patient's restrictions.    Patient has no restrictions at this point and patient was called and relayed this information.    Patient had no further questions at this time. Follow up scheduled with Dr. Phillips on August 2nd at 11:00 am.    PARMINDER JOHNSON LPN on 7/12/2023 at 12:01 PM

## 2023-07-12 NOTE — PATIENT INSTRUCTIONS
You were seen in the ENT Clinic today by Dr. Phillips. If you have any questions or concerns after your appointment, please contact us (see below)       2.   The following recommendations have been made based upon your appointment today:   -Doxycycline: Take 100 mg twice daily for 10 days   -Prednisone: Take 40 mg for 5 days   -Tobramycin rinse: Rinse each nasal cavity twice daily for one month. Additional instructions included below.      3.   Please return to the ENT clinic in approximately 3 weeks.           How to Contact Us:  Send a [employer] message to your provider. Our team will respond to you via [employer]. Occasionally, we will need to call you to get further information.  For urgent matters (Monday-Friday), call the ENT Clinic: [phone] and speak with a call center team member - they will route your call appropriately.   If you'd like to speak directly with a nurse, please find our contact information below. We do our best to check voicemail frequently throughout the day, and will work to call you back within 1-2 days. For urgent matters, please use the general clinic phone numbers listed above.        Ruth Ann OJEDA RN  ENT RN Care Coordinator  Direct: [phone]  Milena ZHU LPN  Direct: [phone]         [facility]  Department of Otolaryngology       Tobramycin Nasal Irrigations     - Tobramycin is a compounded antibiotic which means it is made only when ordered and by compounding pharmacies.  [employer]  [facility] Alli Dos Santos [address] [place] [id number] [phone]  - This medication will arrive in the mail in capsule form.      Cleaning of the Nasal or Sinus Cavity     Please follow all three steps.  Nasal irrigation (cleaning) should be done 2 times/day.     Preparation:  1.  Wash your hands  2.  We suggest that you buy the [employer] Sinus Rinse Kit  3.  Use distilled water or tap water that has been boiled and brought to room temperature. This is important because serious infections can  result from using tap water that is not clean. These infections are very rare, but it's better to be absolutely safe.  4.  Fill the irrigation bottle with room temperature water (distilled or boiled tap water) and add mixture (pre made packet or homemade recipe).                   If you wish to make a homemade recipe:                   Mix 1/4 teaspoon salt (kosher,non-iodine salt) with 1/4 teaspoon baking soda in each bottle.  5. Open Tobramycin Nasal Capsule and empty contents into solution.      Cleansing Irrigation/Sinus Rinse:     1.  Lean forward over a sink and insert the rinse bottle applicator into the right side of your nose. Make sure to point the applicator towards the back of your head.      2.  Tilt head down and to the left side.  With your mouth open (breathing through your mouth), gently direct the water around the inside of your nose until clear fluid starts to drain from the opposite nostril.  This is called flushing or irrigation.     3.  When you have used 1/4 to 1/2 or the solution, switch to the left nostril.     4.  To irrigate the left nostril, tilt your head down and to the right side.  With your mouth open (breathing through your mouth),  gently direct the water around the inside of your nose until clear fluid starts to drain from the opposite nostril.      Cleaning the Equipment:  1.  Throw away any leftover solution  2.  Clean the rinse bottle and cap with clear water. Air dry.        Call the ENT Clinic if you have any questions or concerns at [phone]

## 2023-08-02 ENCOUNTER — OFFICE VISIT (OUTPATIENT)
Dept: OTOLARYNGOLOGY | Facility: CLINIC | Age: 73
End: 2023-08-02
Payer: COMMERCIAL

## 2023-08-02 VITALS
OXYGEN SATURATION: 97 % | DIASTOLIC BLOOD PRESSURE: 61 MMHG | HEART RATE: 60 BPM | WEIGHT: 282 LBS | HEIGHT: 72 IN | SYSTOLIC BLOOD PRESSURE: 125 MMHG | BODY MASS INDEX: 38.19 KG/M2

## 2023-08-02 DIAGNOSIS — D35.2 PITUITARY ADENOMA (H): ICD-10-CM

## 2023-08-02 DIAGNOSIS — J32.1 CHRONIC FRONTAL SINUSITIS: Primary | ICD-10-CM

## 2023-08-02 DIAGNOSIS — J33.9 NASAL POLYP: ICD-10-CM

## 2023-08-02 PROCEDURE — 99213 OFFICE O/P EST LOW 20 MIN: CPT | Mod: 25 | Performed by: OTOLARYNGOLOGY

## 2023-08-02 PROCEDURE — 31237 NSL/SINS NDSC SURG BX POLYPC: CPT | Mod: LT | Performed by: OTOLARYNGOLOGY

## 2023-08-02 PROCEDURE — 88305 TISSUE EXAM BY PATHOLOGIST: CPT | Mod: TC | Performed by: OTOLARYNGOLOGY

## 2023-08-02 PROCEDURE — 88305 TISSUE EXAM BY PATHOLOGIST: CPT | Mod: 26 | Performed by: STUDENT IN AN ORGANIZED HEALTH CARE EDUCATION/TRAINING PROGRAM

## 2023-08-02 ASSESSMENT — PAIN SCALES - GENERAL: PAINLEVEL: NO PAIN (0)

## 2023-08-02 NOTE — PROGRESS NOTES
`                   [facility]                       Return Visit      Encounter date: August 2, 2023    Referring Provider: Richard Lim    Chief Complaint:   CRSwNP  Pituitary Macroadenoma    ID:  CRSwNP  Pituitary Macroadenoma  Left inverted papilloma    Interval History: Bill Daugherty presents for first postoperative visit  Using full face CPAP without issue  Symptoms of nasal obstruction and drainage improved after antibiotics and short course of steroids    [place] Operative History  Procedure Date: 05/25/2023     PREOPERATIVE DIAGNOSES:     1.  Chronic pansinusitis with nasal polyposis.  2.  Pituitary macroadenoma.  3.  History of prior endoscopic sinonasal surgery.     PREOPERATIVE DIAGNOSES:    1.  Chronic pansinusitis with nasal polyposis.  2.  Pituitary macroadenoma.  3.  History of prior endoscopic sinonasal surgery.     PROCEDURE PERFORMED:    1.  Bilateral revision endoscopic total ethmoidectomy, sphenoidotomy (ENT only).  2.  Bilateral revision endoscopic maxillary antrostomy with tissue removal (ENT only).  3.  Endoscopic endonasal transsellar approach for resection of pituitary macroadenoma.  4.  [place] of right-sided pedicled nasoseptal flap pedicled off the posterior septal branch of the sphenopalatine artery -- modifier 22.     SURGEON:  Shadi Phillips MD     CO-SURGEON:  Richard Lim MD    Review of systems: A 14-point review of systems has been conducted and is negative for any notable symptoms, except as dictated in the history of present illness.     Physical Exam:  Vital signs: /61 (BP Location: Left arm, Patient Position: Sitting, Cuff Size: Adult Large)   Pulse 60   Ht 1.829 m (6')   Wt 127.9 kg (282 lb)   SpO2 97%   BMI 38.25 kg/m     General Appearance: No acute distress, appropriate demeanor, conversant  Eyes: moist conjunctivae; EOMI; pupils symmetric; visual acuity grossly intact; no proptosis  Head: normocephalic; overall symmetric appearance  without deformity  Face: overall symmetric without deformity; HB I/VI  Ears: Normal appearance of external ear;   Nose: No external deformity;   See nasal endoscopy    Procedure Note  Procedure performed: Rigid nasal endoscopy with biopsy of left sinonasal lesion  Indication: To evaluate for sinonasal pathology not visualized on routine anterior rhinoscopy  Anesthesia: 4% topical lidocaine with 0.05 % oxymetazoline  Description of procedure: A 30 degree, 3 mm rigid endoscope was inserted into bilateral nasal cavities and the nasal valves, nasal cavity, middle meatus, sphenoethmoid recess, nasopharynx were evaluated for evidence of obstruction, edema, purulence, polyps and/or mass/lesion.     Papillomatous appearing lesion of the left inferior meatus is biopsied using straight through-cutting forceps      Findings  Sinonasal cavity much clearer when compared to prior exam  Suspect left inverted papilloma of the maxillary sinus-biopsy today    The patient tolerated the procedure well without complication.     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       Plan:  Nasal endoscopy with debridement today -septal flap well-positioned again skull base.  No evidence of CSF leak.  Continue irrigations  Continue CPAP.  May use nasal pillow  Biopsy of the left maxillary sinus lesion-suspect inverted papilloma  Will [facility] and update them regarding biopsy results  Follow up with me as needed    Shadi Phillips MD    [facility]  Center  for Skull Base and Pituitary Surgery  [facility]  Department of Otolaryngology - Head & Neck Surgery    The above documentation was completed with the aid of voice recognition dictation software, and as a result, unexpected dictation errors may occur. Please don't hesitate to contact me for any clarification needed regarding this note.      Additional portions of the patient's have been reviewed below.   ~~~~~~~~~~~~~~~~~~~~~~~~~~~~~~~~~~~~~~~~~~~~~~~~~~~~~~~~~~~~~~~~~~~~~~~~~~~~~~~~~~~~~~~~~~~~~~~~~~~~~~~~~~~~~~~~~~~~~~~~~~~~~~~~~~~~~~~    Past Medical History:   Diagnosis Date    Bilateral low back pain with left-sided sciatica     BPH (benign prostatic hyperplasia)     Diabetes mellitus, type 2 (H)     Gout     History of pancreatitis     HLP (hyperkeratosis lenticularis perstans)     HTN (hypertension)     Nephrolithiasis     Obesity     DALLAS (obstructive sleep apnea)     uses cpap    Pituitary macroadenoma (H)     Pulmonary nodules     PVD (posterior vitreous detachment), unspecified laterality     Shoulder pain     Steatosis of liver         Past Surgical History:   Procedure Laterality Date    ARTHROPLASTY KNEE Right 2/16/2023    Procedure: RIGHT TOTAL KNEE ARTHROPLASTY;  Surgeon: Anseth, Scott Duane, MD;  Location:  OR    CHOLECYSTECTOMY      ENDOSCOPIC RETROGRADE CHOLANGIOPANCREATOGRAPHY      OPTICAL TRACKING SYSTEM ENDOSCOPIC ENDONASAL SURGERY Bilateral 5/25/2023    Procedure: stealth assisted Endoscopic endonasal transsellar approach for tumor, Bilateral endoscopic sinus surgery;  Surgeon: Richard Lim MD;  Location: [facility] OR    septoplasty and turbinate reduction Bilateral     TOTAL KNEE ARTHROPLASTY Left         Family History   Problem Relation Age of Onset    Pulmonary Embolism Father         post surgical    Anesthesia Reaction No family hx of         Social History     Socioeconomic History    Marital status:    Tobacco Use    Smoking status: Former     Types:  Cigarettes     Quit date:      Years since quittin.6    Smokeless tobacco: Never   Vaping Use    Vaping Use: Never used   Substance and Sexual Activity    Alcohol use: Yes     Comment: rarely    Drug use: Never    Sexual activity: Never

## 2023-08-02 NOTE — LETTER
8/2/2023       RE: Bill Daugherty  [address] [place] [address]     Dear Colleague,    Thank you for referring your patient, Bill Daugherty, to the [facility] [place] [facility]. Please see a copy of my visit note below.    `                   [facility]                       Return Visit      Encounter date: August 2, 2023    Referring Provider: Richard Lim    Chief Complaint:   CRSwNP  Pituitary Macroadenoma    ID:  CRSwNP  Pituitary Macroadenoma  Left inverted papilloma    Interval History: Bill Daugherty presents for first postoperative visit  Using full face CPAP without issue  Symptoms of nasal obstruction and drainage improved after antibiotics and short course of steroids    [place] Operative History  Procedure Date: 05/25/2023     PREOPERATIVE DIAGNOSES:     1.  Chronic pansinusitis with nasal polyposis.  2.  Pituitary macroadenoma.  3.  History of prior endoscopic sinonasal surgery.     PREOPERATIVE DIAGNOSES:    1.  Chronic pansinusitis with nasal polyposis.  2.  Pituitary macroadenoma.  3.  History of prior endoscopic sinonasal surgery.     PROCEDURE PERFORMED:    1.  Bilateral revision endoscopic total ethmoidectomy, sphenoidotomy (ENT only).  2.  Bilateral revision endoscopic maxillary antrostomy with tissue removal (ENT only).  3.  Endoscopic endonasal transsellar approach for resection of pituitary macroadenoma.  4.  [place] of right-sided pedicled nasoseptal flap pedicled off the posterior septal branch of the sphenopalatine artery -- modifier 22.     SURGEON:  Shadi Phillips MD     CO-SURGEON:  Richard Lim MD    Review of systems: A 14-point review of systems has been conducted and is negative for any notable symptoms, except as dictated in the history of present illness.     Physical Exam:  Vital signs: /61 (BP Location: Left arm, Patient Position: Sitting, Cuff Size:  Adult Large)   Pulse 60   Ht 1.829 m (6')   Wt 127.9 kg (282 lb)   SpO2 97%   BMI 38.25 kg/m     General Appearance: No acute distress, appropriate demeanor, conversant  Eyes: moist conjunctivae; EOMI; pupils symmetric; visual acuity grossly intact; no proptosis  Head: normocephalic; overall symmetric appearance without deformity  Face: overall symmetric without deformity; HB I/VI  Ears: Normal appearance of external ear;   Nose: No external deformity;   See nasal endoscopy    Procedure Note  Procedure performed: Rigid nasal endoscopy with biopsy of left sinonasal lesion  Indication: To evaluate for sinonasal pathology not visualized on routine anterior rhinoscopy  Anesthesia: 4% topical lidocaine with 0.05 % oxymetazoline  Description of procedure: A 30 degree, 3 mm rigid endoscope was inserted into bilateral nasal cavities and the nasal valves, nasal cavity, middle meatus, sphenoethmoid recess, nasopharynx were evaluated for evidence of obstruction, edema, purulence, polyps and/or mass/lesion.     Papillomatous appearing lesion of the left inferior meatus is biopsied using straight through-cutting forceps      Findings  Sinonasal cavity much clearer when compared to prior exam  Suspect left inverted papilloma of the maxillary sinus-biopsy today    The patient tolerated the procedure well without complication.     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       Plan:  Nasal endoscopy with debridement today -septal flap well-positioned again skull base.  No evidence of CSF  leak.  Continue irrigations  Continue CPAP.  May use nasal pillow  Biopsy of the left maxillary sinus lesion-suspect inverted papilloma  Will [facility] and update them regarding biopsy results  Follow up with me as needed    Shadi Phillips MD    [facility]  Center for Skull Base and Pituitary Surgery  [facility]  Department of Otolaryngology - Head & Neck Surgery    The above documentation was completed with the aid of voice recognition dictation software, and as a result, unexpected dictation errors may occur. Please don't hesitate to contact me for any clarification needed regarding this note.      Additional portions of the patient's have been reviewed below.   ~~~~~~~~~~~~~~~~~~~~~~~~~~~~~~~~~~~~~~~~~~~~~~~~~~~~~~~~~~~~~~~~~~~~~~~~~~~~~~~~~~~~~~~~~~~~~~~~~~~~~~~~~~~~~~~~~~~~~~~~~~~~~~~~~~~~~~~    Past Medical History:   Diagnosis Date     Bilateral low back pain with left-sided sciatica      BPH (benign prostatic hyperplasia)      Diabetes mellitus, type 2 (H)      Gout      History of pancreatitis      HLP (hyperkeratosis lenticularis perstans)      HTN (hypertension)      Nephrolithiasis      Obesity      D[place]AS (obstructive sleep apnea)     uses cpap     Pituitary macroadenoma (H)      Pulmonary nodules      PVD (posterior vitreous detachment), unspecified laterality      Shoulder pain      Steatosis of liver         Past Surgical History:   Procedure Laterality Date     ARTHROPLASTY KNEE Right 2/16/2023    Procedure: RIGHT TOTAL KNEE ARTHROPLASTY;  Surgeon: Anseth, Scott Duane, MD;  Location:  OR     CHOLECYSTECTOMY       ENDOSCOPIC RETROGRADE CHOLANGIOPANCREATOGRAPHY       OPTICAL TRACKING SYSTEM ENDOSCOPIC ENDONASAL SURGERY Bilateral 5/25/2023    Procedure: stealth assisted Endoscopic endonasal transsellar approach for tumor, Bilateral endoscopic sinus surgery;  Surgeon: Richard Lim MD;  [facility]     septoplasty and  turbinate reduction Bilateral      TOTAL KNEE ARTHROPLASTY Left         Family History   Problem Relation Age of Onset     Pulmonary Embolism Father         post surgical     Anesthesia Reaction No family hx of         Social History     Socioeconomic History     Marital status:    Tobacco Use     Smoking status: Former     Types: Cigarettes     Quit date:      Years since quittin.6     Smokeless tobacco: Never   Vaping Use     Vaping Use: Never used   Substance and Sexual Activity     Alcohol use: Yes     Comment: rarely     Drug use: Never     Sexual activity: Never            Again, thank you for allowing me to participate in the care of your patient.      Sincerely,    Shadi Phillips MD

## 2023-08-02 NOTE — PATIENT INSTRUCTIONS
You were seen in the ENT Clinic today by Dr. Phillips. If you have any questions or concerns after your appointment, please contact us (see below)       2.   The following recommendations have been made based upon your appointment today:   -Continue antibiotic rinses.   -Flonase (fluticasone) nasal spray: Spray 2 sprays per nostril twice daily.      3.   We have sent a specimen to the lab. Dr. Phillips will follow up with you on those results once he has reviewed them and determined a plan of care.             How to Contact Us:  Send a [employer] message to your provider. Our team will respond to you via [employer]. Occasionally, we will need to call you to get further information.  For urgent matters (Monday-Friday), call the ENT Clinic: [phone] and speak with a call center team member - they will route your call appropriately.   If you'd like to speak directly with a nurse, please find our contact information below. We do our best to check voicemail frequently throughout the day, and will work to call you back within 1-2 days. For urgent matters, please use the general clinic phone numbers listed above.        Ruth Ann OJEDA RN  ENT RN Care Coordinator  Direct: [phone]  Milena ZHU LPN  Direct: [phone]         [facility]  Department of Otolaryngology

## 2023-08-03 LAB
PATH REPORT.COMMENTS IMP SPEC: NORMAL
PATH REPORT.COMMENTS IMP SPEC: NORMAL
PATH REPORT.FINAL DX SPEC: NORMAL
PATH REPORT.GROSS SPEC: NORMAL
PATH REPORT.MICROSCOPIC SPEC OTHER STN: NORMAL
PATH REPORT.RELEVANT HX SPEC: NORMAL
PHOTO IMAGE: NORMAL

## 2023-10-14 ENCOUNTER — HEALTH MAINTENANCE LETTER (OUTPATIENT)
Age: 73
End: 2023-10-14

## 2023-12-15 ENCOUNTER — TELEPHONE (OUTPATIENT)
Dept: NEUROSURGERY | Facility: CLINIC | Age: 73
End: 2023-12-15

## 2024-03-02 ENCOUNTER — HEALTH MAINTENANCE LETTER (OUTPATIENT)
Age: 74
End: 2024-03-02

## 2024-05-06 PROCEDURE — 83945 ASSAY OF OXALATE: CPT | Mod: ORL

## 2024-05-06 PROCEDURE — 82507 ASSAY OF CITRATE: CPT | Mod: ORL

## 2024-05-07 ENCOUNTER — LAB REQUISITION (OUTPATIENT)
Dept: LAB | Facility: CLINIC | Age: 74
End: 2024-05-07

## 2024-05-07 LAB
Lab: NORMAL
Lab: NORMAL
PERFORMING LABORATORY: NORMAL
PERFORMING LABORATORY: NORMAL
SPECIMEN STATUS: NORMAL
SPECIMEN STATUS: NORMAL
TEST NAME: NORMAL
TEST NAME: NORMAL

## 2024-05-08 LAB — MISCELLANEOUS TEST 1 (ARUP): NORMAL

## 2024-05-11 LAB — MISCELLANEOUS TEST 1 (ARUP): NORMAL

## 2024-07-20 ENCOUNTER — HEALTH MAINTENANCE LETTER (OUTPATIENT)
Age: 74
End: 2024-07-20

## 2024-12-07 ENCOUNTER — HEALTH MAINTENANCE LETTER (OUTPATIENT)
Age: 74
End: 2024-12-07

## 2025-03-15 ENCOUNTER — HEALTH MAINTENANCE LETTER (OUTPATIENT)
Age: 75
End: 2025-03-15

## 2025-05-17 ENCOUNTER — HEALTH MAINTENANCE LETTER (OUTPATIENT)
Age: 75
End: 2025-05-17

## 2025-06-28 ENCOUNTER — HEALTH MAINTENANCE LETTER (OUTPATIENT)
Age: 75
End: 2025-06-28

## (undated) DEVICE — POINTER MEDT NAVIGATION AXIEM 9735319

## (undated) DEVICE — PACK NEURO MINOR UMMC SNE32MNMU4

## (undated) DEVICE — DRAPE MAYO STAND 23X54 8337

## (undated) DEVICE — STPL SKIN 35W 6.9MM  PXW35

## (undated) DEVICE — SPLINT NASAL DOYLE BREATHEASY 20-10500

## (undated) DEVICE — DRAPE EYE SHEET 8441

## (undated) DEVICE — LINEN TOWEL PACK X30 5481

## (undated) DEVICE — CATH TRAY FOLEY SURESTEP 16FR W/TMP PRB STLK LATEX A319416AM

## (undated) DEVICE — SOLUTION WOUND CLEANSING 3/4OZ 10% PVP EA-L3011FB-50

## (undated) DEVICE — MANIFOLD NEPTUNE 4 PORT 700-20

## (undated) DEVICE — GLOVE BIOGEL PI MICRO SZ 7.0 48570

## (undated) DEVICE — PROTECTOR ARM ONE-STEP TRENDELENBURG 40418

## (undated) DEVICE — DRSG TELFA 3X8" 1238

## (undated) DEVICE — NDL SPINAL 20GA 2.5"

## (undated) DEVICE — ENDO SHEATH STORZ SHARPSITE ENDOSCRUB 0DEG 4MM 1912000

## (undated) DEVICE — TUBING STRYKER IRRIGATION CASSETTE 5400-050-001

## (undated) DEVICE — DECANTER VIAL 2006S

## (undated) DEVICE — NDL 21GA 1.5"

## (undated) DEVICE — BLADE SHAVER SERRATED 4MM ROTATE 1884002HRE

## (undated) DEVICE — SOL WATER IRRIG 1000ML BOTTLE 2F7114

## (undated) DEVICE — SLEEVE IRRIGATION MIS 13.0CM 5/PKG 5407-010-950

## (undated) DEVICE — SOL NACL 0.9% IRRIG 3000ML BAG 2B7477

## (undated) DEVICE — ESU BIPOLAR SEALER AQUAMANTYS MIS FLEX 23-321-1

## (undated) DEVICE — ENDO SHEATH STORZ SHARPSITE ENDOSCRUB 30DEG 4MM 1912010

## (undated) DEVICE — TUBING SUCTION MEDI-VAC SOFT 3/16"X20' N520A

## (undated) DEVICE — LINEN TOWEL PACK X6 WHITE 5487

## (undated) DEVICE — SU ETHILON 3-0 PS-1 18" 1663H

## (undated) DEVICE — COVER CAMERA VIDEO LASER 9X96" 04-CC227

## (undated) DEVICE — BUR STRK ROUND DIAMOND 4.0MM COARSE 8450-012-040DC

## (undated) DEVICE — EYE PREP BETADINE 5% SOLUTION 30ML 0065-0411-30

## (undated) DEVICE — SYR 30ML LL W/O NDL 302832

## (undated) DEVICE — DRSG NASOPORE FRAG FIRM 8CM 5400-020-008

## (undated) DEVICE — PREP DURAPREP 26ML APL 8630

## (undated) DEVICE — STRAP UNIVERSAL POSITIONING 2-PIECE 4X47X76" 91-287

## (undated) DEVICE — DRSG GAUZE 4X4" TRAY 6939

## (undated) DEVICE — SURGICEL HEMOSTAT 4X8" 1952

## (undated) DEVICE — NDL 25GA 2"  8881200441

## (undated) DEVICE — DRAPE POUCH INSTRUMENT 1018

## (undated) DEVICE — TRACKER PATIENT NON-INVASIVE AXIEM 9734887

## (undated) DEVICE — PACK TOTAL KNEE SOP15TKFSD

## (undated) DEVICE — DRSG KERLIX FLUFFS X5

## (undated) DEVICE — ESU GROUND PAD UNIVERSAL W/O CORD

## (undated) DEVICE — DRSG STERI STRIP 1/2X4" R1547

## (undated) DEVICE — BLADE FEATHER MIZUHO K-6400

## (undated) DEVICE — TUBING SMOKE EVAC 3/8"X10' 0702-045-023

## (undated) DEVICE — DRSG KERLIX 4 1/2"X4YDS ROLL 6715

## (undated) DEVICE — HOOD SURG T7PLUS PEEL AWAY FACE SHIELD STRL LF 0416-801-100

## (undated) DEVICE — WRAP EZY KNEE

## (undated) DEVICE — DRSG AQUACEL AG 3.5X9.75" HYDROFIBER 412011

## (undated) DEVICE — DRAPE CORETEMP FLUID WARM SYSTEM 62X56IN CTD200

## (undated) DEVICE — SPONGE SURGIFOAM 01GM POWDER 1978

## (undated) DEVICE — BLADE PRECISION 25X1.27X9 6725127090

## (undated) DEVICE — TRACKER ENT OTS INSTRUMENT FUSION 9733533

## (undated) DEVICE — Device

## (undated) DEVICE — BONE CLEANING TIP INTERPULSE  0210-010-000

## (undated) DEVICE — GLOVE BIOGEL PI SZ 7.5 40875

## (undated) DEVICE — SU VICRYL 1 CTX CR 8X18" J765D

## (undated) DEVICE — CAST PADDING 6" UNSTERILE 9046

## (undated) DEVICE — BUR 30K TAPER CHOANAL ATRESIA 1884015RTD

## (undated) DEVICE — DRAPE SHEET REV FOLD 3/4 9349

## (undated) DEVICE — SOL NACL 0.9% IRRIG 1000ML BOTTLE 2F7124

## (undated) DEVICE — SPONGE COTTONOID NEURO 1/2"X1/2" 30-054

## (undated) DEVICE — GLOVE BIOGEL PI MICRO INDICATOR UNDERGLOVE SZ 8.0 48980

## (undated) DEVICE — SPONGE SURGIFOAM 100 1974

## (undated) DEVICE — BLADE SAW SAGITTAL STRK 18X90X1.27MM HD SYS 6 6118-127-090

## (undated) DEVICE — BLADE KNIFE SURG 11 371111

## (undated) DEVICE — CAST PADDING 4" COTTON WEBRIL UNSTERILE 9084

## (undated) DEVICE — IOM SUPPLIES/CASE FEE

## (undated) DEVICE — SUCTION IRR SYSTEM W/O TIP INTERPULSE HANDPIECE 0210-100-000

## (undated) DEVICE — BLADE CLIPPER 4412A

## (undated) DEVICE — LINEN TOWEL PACK X5 5464

## (undated) DEVICE — SU VICRYL 2-0 CT 36" J957H

## (undated) DEVICE — ESU ELEC NDL 6" E1552-6

## (undated) DEVICE — SUCTION MANIFOLD NEPTUNE 2 SYS 4 PORT 0702-020-000

## (undated) DEVICE — CAST PADDING 6" STERILE 9046S

## (undated) DEVICE — DRSG ADAPTIC 3X8" 6113

## (undated) DEVICE — SOL RINGERS LACTATED 1000ML BAG 07953-09

## (undated) DEVICE — GOWN IMPERVIOUS SPECIALTY XL/XLONG 39049

## (undated) DEVICE — SYR 10ML FINGER CONTROL W/O NDL 309695

## (undated) DEVICE — APPLICATOR EXTENDED TIP DURASEAL 8CM 205108

## (undated) DEVICE — DRSG GAUZE 4X4" 3033

## (undated) DEVICE — GUIDE LG SPIWAY SRG ENSL

## (undated) DEVICE — DECANTER BAG 2002S

## (undated) DEVICE — SPONGE COTTONOID 1/2X3" 80-1407

## (undated) DEVICE — ENDO INSERT ESU BIPOLAR FCP STORZ VERTICAL CLOSING 28164FGL

## (undated) DEVICE — GLOVE BIOGEL PI MICRO SZ 8.0 48580

## (undated) DEVICE — GOWN XXLG REINFORCED 9071EL

## (undated) DEVICE — DRAPE STERI TOWEL LG 1010

## (undated) DEVICE — SOL BENZOIN 0.5OZ

## (undated) DEVICE — EYE FLUORESCEIN OPHTHALMIC STRIP FLO-GLO 1272111

## (undated) DEVICE — ESU GROUND PAD ADULT W/CORD E7507

## (undated) DEVICE — BONE CEMENT MIXEVAC HI VAC W/CARTRIDGE 0306-563-000

## (undated) DEVICE — BLADE SHAVER SINUS 3.5MM RAD 60 ROTATE 1883516HRE

## (undated) DEVICE — DRAPE IOBAN INCISE 23X17" 6650EZ

## (undated) DEVICE — DRAPE STERI U 1015

## (undated) RX ORDER — HYDROMORPHONE HYDROCHLORIDE 1 MG/ML
INJECTION, SOLUTION INTRAMUSCULAR; INTRAVENOUS; SUBCUTANEOUS
Status: DISPENSED
Start: 2023-02-16

## (undated) RX ORDER — TRANEXAMIC ACID 650 MG/1
TABLET ORAL
Status: DISPENSED
Start: 2023-02-16

## (undated) RX ORDER — FENTANYL CITRATE 50 UG/ML
INJECTION, SOLUTION INTRAMUSCULAR; INTRAVENOUS
Status: DISPENSED
Start: 2023-05-25

## (undated) RX ORDER — ONDANSETRON 2 MG/ML
INJECTION INTRAMUSCULAR; INTRAVENOUS
Status: DISPENSED
Start: 2023-05-25

## (undated) RX ORDER — OXYMETAZOLINE HYDROCHLORIDE 0.05 G/100ML
SPRAY NASAL
Status: DISPENSED
Start: 2023-05-25

## (undated) RX ORDER — LIDOCAINE HYDROCHLORIDE AND EPINEPHRINE 10; 10 MG/ML; UG/ML
INJECTION, SOLUTION INFILTRATION; PERINEURAL
Status: DISPENSED
Start: 2023-05-25

## (undated) RX ORDER — INDOCYANINE GREEN AND WATER 25 MG
KIT INJECTION
Status: DISPENSED
Start: 2023-05-25

## (undated) RX ORDER — GLYCOPYRROLATE 0.2 MG/ML
INJECTION, SOLUTION INTRAMUSCULAR; INTRAVENOUS
Status: DISPENSED
Start: 2023-02-16

## (undated) RX ORDER — ONDANSETRON 2 MG/ML
INJECTION INTRAMUSCULAR; INTRAVENOUS
Status: DISPENSED
Start: 2023-02-16

## (undated) RX ORDER — FENTANYL CITRATE 50 UG/ML
INJECTION, SOLUTION INTRAMUSCULAR; INTRAVENOUS
Status: DISPENSED
Start: 2023-02-16

## (undated) RX ORDER — OXYCODONE HYDROCHLORIDE 10 MG/1
TABLET ORAL
Status: DISPENSED
Start: 2023-05-25

## (undated) RX ORDER — FENTANYL CITRATE-0.9 % NACL/PF 10 MCG/ML
PLASTIC BAG, INJECTION (ML) INTRAVENOUS
Status: DISPENSED
Start: 2023-05-25

## (undated) RX ORDER — CEFAZOLIN SODIUM/WATER 3 G/30 ML
SYRINGE (ML) INTRAVENOUS
Status: DISPENSED
Start: 2023-05-25

## (undated) RX ORDER — SODIUM CHLORIDE, SODIUM LACTATE, POTASSIUM CHLORIDE, CALCIUM CHLORIDE 600; 310; 30; 20 MG/100ML; MG/100ML; MG/100ML; MG/100ML
INJECTION, SOLUTION INTRAVENOUS
Status: DISPENSED
Start: 2023-05-25

## (undated) RX ORDER — FENTANYL CITRATE 0.05 MG/ML
INJECTION, SOLUTION INTRAMUSCULAR; INTRAVENOUS
Status: DISPENSED
Start: 2023-02-16

## (undated) RX ORDER — EPHEDRINE SULFATE 50 MG/ML
INJECTION, SOLUTION INTRAMUSCULAR; INTRAVENOUS; SUBCUTANEOUS
Status: DISPENSED
Start: 2023-02-16

## (undated) RX ORDER — DEXAMETHASONE SODIUM PHOSPHATE 4 MG/ML
INJECTION, SOLUTION INTRA-ARTICULAR; INTRALESIONAL; INTRAMUSCULAR; INTRAVENOUS; SOFT TISSUE
Status: DISPENSED
Start: 2023-02-16

## (undated) RX ORDER — HYDROMORPHONE HYDROCHLORIDE 1 MG/ML
INJECTION, SOLUTION INTRAMUSCULAR; INTRAVENOUS; SUBCUTANEOUS
Status: DISPENSED
Start: 2023-05-25

## (undated) RX ORDER — DEXAMETHASONE SODIUM PHOSPHATE 4 MG/ML
INJECTION, SOLUTION INTRA-ARTICULAR; INTRALESIONAL; INTRAMUSCULAR; INTRAVENOUS; SOFT TISSUE
Status: DISPENSED
Start: 2023-05-25

## (undated) RX ORDER — EPHEDRINE SULFATE 50 MG/ML
INJECTION, SOLUTION INTRAMUSCULAR; INTRAVENOUS; SUBCUTANEOUS
Status: DISPENSED
Start: 2023-05-25

## (undated) RX ORDER — ACETAMINOPHEN 325 MG/1
TABLET ORAL
Status: DISPENSED
Start: 2023-02-16

## (undated) RX ORDER — CEFAZOLIN SODIUM/WATER 3 G/30 ML
SYRINGE (ML) INTRAVENOUS
Status: DISPENSED
Start: 2023-02-16

## (undated) RX ORDER — EPINEPHRINE NASAL SOLUTION 1 MG/ML
SOLUTION NASAL
Status: DISPENSED
Start: 2023-05-25

## (undated) RX ORDER — VANCOMYCIN HYDROCHLORIDE 1 G/20ML
INJECTION, POWDER, LYOPHILIZED, FOR SOLUTION INTRAVENOUS
Status: DISPENSED
Start: 2023-02-16

## (undated) RX ORDER — PROPOFOL 10 MG/ML
INJECTION, EMULSION INTRAVENOUS
Status: DISPENSED
Start: 2023-05-25